# Patient Record
Sex: MALE | Race: WHITE | NOT HISPANIC OR LATINO | Employment: OTHER | URBAN - METROPOLITAN AREA
[De-identification: names, ages, dates, MRNs, and addresses within clinical notes are randomized per-mention and may not be internally consistent; named-entity substitution may affect disease eponyms.]

---

## 2022-12-14 ENCOUNTER — TELEPHONE (OUTPATIENT)
Dept: GASTROENTEROLOGY | Facility: CLINIC | Age: 67
End: 2022-12-14
Payer: MEDICARE

## 2022-12-14 NOTE — TELEPHONE ENCOUNTER
----- Message from Mukund Jain MA sent at 12/6/2022  8:57 AM CST -----  Contact: Patient wife    ----- Message -----  From: Sadaf Rodriguez  Sent: 12/6/2022   8:46 AM CST  To: [facility], #    Type:  Sooner Appointment Request      Name of Caller:Patient wife  When is the first available appointment?02/14/2022  Symptoms:HOSPITAL FOLLOW UP/diarrhea   Would the patient rather a call back or a response via [facility]? Call back  Best Call Back Number:[phone]   Additional Information: Please assist

## 2023-01-29 ENCOUNTER — OFFICE VISIT (OUTPATIENT)
Dept: URGENT CARE | Facility: CLINIC | Age: 68
End: 2023-01-29
Payer: MEDICARE

## 2023-01-29 VITALS
HEART RATE: 69 BPM | DIASTOLIC BLOOD PRESSURE: 53 MMHG | OXYGEN SATURATION: 98 % | RESPIRATION RATE: 14 BRPM | WEIGHT: 127 LBS | SYSTOLIC BLOOD PRESSURE: 113 MMHG | TEMPERATURE: 98 F | BODY MASS INDEX: 19.25 KG/M2 | HEIGHT: 68 IN

## 2023-01-29 DIAGNOSIS — R53.83 FATIGUE, UNSPECIFIED TYPE: Primary | ICD-10-CM

## 2023-01-29 DIAGNOSIS — R05.9 COUGH, UNSPECIFIED TYPE: ICD-10-CM

## 2023-01-29 LAB
CTP QC/QA: YES
SARS-COV-2 AG RESP QL IA.RAPID: NEGATIVE

## 2023-01-29 PROCEDURE — 3074F SYST BP LT 130 MM HG: CPT | Mod: CPTII,S$GLB,,

## 2023-01-29 PROCEDURE — 3078F DIAST BP <80 MM HG: CPT | Mod: CPTII,S$GLB,,

## 2023-01-29 PROCEDURE — 1126F AMNT PAIN NOTED NONE PRSNT: CPT | Mod: CPTII,S$GLB,,

## 2023-01-29 PROCEDURE — 1126F PR PAIN SEVERITY QUANTIFIED, NO PAIN PRESENT: ICD-10-PCS | Mod: CPTII,S$GLB,,

## 2023-01-29 PROCEDURE — 1160F PR REVIEW ALL MEDS BY PRESCRIBER/CLIN PHARMACIST DOCUMENTED: ICD-10-PCS | Mod: CPTII,S$GLB,,

## 2023-01-29 PROCEDURE — 87811 SARS CORONAVIRUS 2 ANTIGEN POCT, MANUAL READ: ICD-10-PCS | Mod: QW,S$GLB,,

## 2023-01-29 PROCEDURE — 3008F PR BODY MASS INDEX (BMI) DOCUMENTED: ICD-10-PCS | Mod: CPTII,S$GLB,,

## 2023-01-29 PROCEDURE — 99203 PR OFFICE/OUTPT VISIT, NEW, LEVL III, 30-44 MIN: ICD-10-PCS | Mod: S$GLB,,,

## 2023-01-29 PROCEDURE — 99203 OFFICE O/P NEW LOW 30 MIN: CPT | Mod: S$GLB,,,

## 2023-01-29 PROCEDURE — 87811 SARS-COV-2 COVID19 W/OPTIC: CPT | Mod: QW,S$GLB,,

## 2023-01-29 PROCEDURE — 3078F PR MOST RECENT DIASTOLIC BLOOD PRESSURE < 80 MM HG: ICD-10-PCS | Mod: CPTII,S$GLB,,

## 2023-01-29 PROCEDURE — 1159F MED LIST DOCD IN RCRD: CPT | Mod: CPTII,S$GLB,,

## 2023-01-29 PROCEDURE — 1159F PR MEDICATION LIST DOCUMENTED IN MEDICAL RECORD: ICD-10-PCS | Mod: CPTII,S$GLB,,

## 2023-01-29 PROCEDURE — 3008F BODY MASS INDEX DOCD: CPT | Mod: CPTII,S$GLB,,

## 2023-01-29 PROCEDURE — 1160F RVW MEDS BY RX/DR IN RCRD: CPT | Mod: CPTII,S$GLB,,

## 2023-01-29 PROCEDURE — 3074F PR MOST RECENT SYSTOLIC BLOOD PRESSURE < 130 MM HG: ICD-10-PCS | Mod: CPTII,S$GLB,,

## 2023-01-29 NOTE — PROGRESS NOTES
"Subjective:       Patient ID: Pacheco Rivas is a 67 y.o. male.    Vitals:  height is 5' 8" (1.727 m) and weight is 57.6 kg (127 lb). His tympanic temperature is 97.8 °F (36.6 °C). His blood pressure is 113/53 (abnormal) and his pulse is 69. His respiration is 14 and oxygen saturation is 98%.     Chief Complaint: Cough    Patient presents with cough and fatigue. Symptoms started yesterday.     Cough  This is a new problem. The current episode started yesterday. The problem has been unchanged. The problem occurs every few minutes. The cough is Non-productive. Pertinent negatives include no chest pain, chills, ear congestion, ear pain, fever, headaches, heartburn, hemoptysis, myalgias, nasal congestion, postnasal drip, rash, rhinorrhea, sore throat, shortness of breath, sweats, weight loss or wheezing. Nothing aggravates the symptoms. He has tried OTC cough suppressant for the symptoms. The treatment provided no relief. There is no history of asthma, bronchiectasis, bronchitis, COPD, emphysema, environmental allergies or pneumonia.     Constitution: Negative for chills and fever.   HENT:  Negative for ear pain, postnasal drip and sore throat.    Cardiovascular:  Negative for chest pain.   Respiratory:  Positive for cough. Negative for bloody sputum, shortness of breath and wheezing.    Gastrointestinal:  Negative for heartburn.   Musculoskeletal:  Negative for muscle ache.   Skin:  Negative for rash.   Allergic/Immunologic: Negative for environmental allergies.   Neurological:  Negative for headaches.     Objective:      Physical Exam   Constitutional: He is oriented to person, place, and time. He appears well-developed. He is cooperative.  Non-toxic appearance. He does not appear ill. No distress.      Comments:Appears underweight  NAD  Sitting comfortably in exam room  Walking unassisted     HENT:   Head: Normocephalic and atraumatic.   Ears:   Right Ear: Hearing, tympanic membrane, external ear and ear canal " normal.   Left Ear: Hearing, tympanic membrane, external ear and ear canal normal.   Nose: Nose normal. No mucosal edema, rhinorrhea or nasal deformity. No epistaxis. Right sinus exhibits no maxillary sinus tenderness and no frontal sinus tenderness. Left sinus exhibits no maxillary sinus tenderness and no frontal sinus tenderness.   Mouth/Throat: Uvula is midline, oropharynx is clear and moist and mucous membranes are normal. No trismus in the jaw. Normal dentition. No uvula swelling. No oropharyngeal exudate, posterior oropharyngeal edema or posterior oropharyngeal erythema.   Eyes: Conjunctivae and lids are normal. No scleral icterus.   Neck: Trachea normal and phonation normal. Neck supple. No edema present. No erythema present. No neck rigidity present.   Cardiovascular: Normal rate, regular rhythm, normal heart sounds and normal pulses.   Pulmonary/Chest: Effort normal and breath sounds normal. No respiratory distress. He has no decreased breath sounds. He has no rhonchi.   Abdominal: Normal appearance.   Musculoskeletal: Normal range of motion.         General: No deformity. Normal range of motion.   Neurological: He is alert and oriented to person, place, and time. He exhibits normal muscle tone. Coordination normal.   Skin: Skin is warm, dry, intact, not diaphoretic and not pale. Capillary refill takes less than 2 seconds.   Psychiatric: His speech is normal and behavior is normal. Judgment and thought content normal.   Nursing note and vitals reviewed.      Results for orders placed or performed in visit on 01/29/23   SARS Coronavirus 2 Antigen, POCT Manual Read   Result Value Ref Range    SARS Coronavirus 2 Antigen Negative Negative     Acceptable Yes        Assessment:       1. Fatigue, unspecified type    2. Cough, unspecified type          Plan:         Covid negative. Discussed d/c meclizine for a few doses to see if this helps his fatigue. Suspect there are multiple causes to his  fatigue though as patient has been hospitalized and see by his PCP multiple times over the last few months for C. Diff and hypotension. Discussed follow up with cardiology regarding hypotension. Discussed supportive care for cough and RTC/ED precautions which patient and his wife verbalized understanding to.     Fatigue, unspecified type    Cough, unspecified type  -     SARS Coronavirus 2 Antigen, POCT Manual Read

## 2023-03-04 ENCOUNTER — OFFICE VISIT (OUTPATIENT)
Dept: URGENT CARE | Facility: CLINIC | Age: 68
End: 2023-03-04
Payer: MEDICARE

## 2023-03-04 ENCOUNTER — HOSPITAL ENCOUNTER (OUTPATIENT)
Dept: RADIOLOGY | Facility: CLINIC | Age: 68
Discharge: HOME | End: 2023-03-04
Attending: NURSE PRACTITIONER
Payer: MEDICARE

## 2023-03-04 VITALS
BODY MASS INDEX: 19.25 KG/M2 | TEMPERATURE: 97 F | OXYGEN SATURATION: 100 % | SYSTOLIC BLOOD PRESSURE: 114 MMHG | HEART RATE: 83 BPM | RESPIRATION RATE: 14 BRPM | DIASTOLIC BLOOD PRESSURE: 56 MMHG | WEIGHT: 127 LBS | HEIGHT: 68 IN

## 2023-03-04 DIAGNOSIS — R68.89 FLU-LIKE SYMPTOMS: ICD-10-CM

## 2023-03-04 DIAGNOSIS — I15.2 HYPERTENSION ASSOCIATED WITH DIABETES: ICD-10-CM

## 2023-03-04 DIAGNOSIS — Z20.822 SUSPECTED COVID-19 VIRUS INFECTION: ICD-10-CM

## 2023-03-04 DIAGNOSIS — R53.83 OTHER FATIGUE: ICD-10-CM

## 2023-03-04 DIAGNOSIS — R05.9 COUGH, UNSPECIFIED TYPE: ICD-10-CM

## 2023-03-04 DIAGNOSIS — E11.59 HYPERTENSION ASSOCIATED WITH DIABETES: ICD-10-CM

## 2023-03-04 DIAGNOSIS — R53.83 OTHER FATIGUE: Primary | ICD-10-CM

## 2023-03-04 DIAGNOSIS — R11.2 NAUSEA AND VOMITING, UNSPECIFIED VOMITING TYPE: ICD-10-CM

## 2023-03-04 LAB
CTP QC/QA: YES
CTP QC/QA: YES
POC MOLECULAR INFLUENZA A AGN: NEGATIVE
POC MOLECULAR INFLUENZA B AGN: NEGATIVE
SARS-COV-2 AG RESP QL IA.RAPID: NEGATIVE

## 2023-03-04 PROCEDURE — 87502 POCT INFLUENZA A/B MOLECULAR: ICD-10-PCS | Mod: QW,S$GLB,, | Performed by: NURSE PRACTITIONER

## 2023-03-04 PROCEDURE — 3008F PR BODY MASS INDEX (BMI) DOCUMENTED: ICD-10-PCS | Mod: CPTII,S$GLB,, | Performed by: NURSE PRACTITIONER

## 2023-03-04 PROCEDURE — 99214 OFFICE O/P EST MOD 30 MIN: CPT | Mod: S$GLB,,, | Performed by: NURSE PRACTITIONER

## 2023-03-04 PROCEDURE — 1126F PR PAIN SEVERITY QUANTIFIED, NO PAIN PRESENT: ICD-10-PCS | Mod: CPTII,S$GLB,, | Performed by: NURSE PRACTITIONER

## 2023-03-04 PROCEDURE — 3078F DIAST BP <80 MM HG: CPT | Mod: CPTII,S$GLB,, | Performed by: NURSE PRACTITIONER

## 2023-03-04 PROCEDURE — 3074F PR MOST RECENT SYSTOLIC BLOOD PRESSURE < 130 MM HG: ICD-10-PCS | Mod: CPTII,S$GLB,, | Performed by: NURSE PRACTITIONER

## 2023-03-04 PROCEDURE — 1159F PR MEDICATION LIST DOCUMENTED IN MEDICAL RECORD: ICD-10-PCS | Mod: CPTII,S$GLB,, | Performed by: NURSE PRACTITIONER

## 2023-03-04 PROCEDURE — 1160F PR REVIEW ALL MEDS BY PRESCRIBER/CLIN PHARMACIST DOCUMENTED: ICD-10-PCS | Mod: CPTII,S$GLB,, | Performed by: NURSE PRACTITIONER

## 2023-03-04 PROCEDURE — 3074F SYST BP LT 130 MM HG: CPT | Mod: CPTII,S$GLB,, | Performed by: NURSE PRACTITIONER

## 2023-03-04 PROCEDURE — 71046 X-RAY EXAM CHEST 2 VIEWS: CPT | Mod: S$GLB,,, | Performed by: RADIOLOGY

## 2023-03-04 PROCEDURE — 99214 PR OFFICE/OUTPT VISIT, EST, LEVL IV, 30-39 MIN: ICD-10-PCS | Mod: S$GLB,,, | Performed by: NURSE PRACTITIONER

## 2023-03-04 PROCEDURE — 1126F AMNT PAIN NOTED NONE PRSNT: CPT | Mod: CPTII,S$GLB,, | Performed by: NURSE PRACTITIONER

## 2023-03-04 PROCEDURE — 87811 SARS CORONAVIRUS 2 ANTIGEN POCT, MANUAL READ: ICD-10-PCS | Mod: QW,S$GLB,, | Performed by: NURSE PRACTITIONER

## 2023-03-04 PROCEDURE — 1159F MED LIST DOCD IN RCRD: CPT | Mod: CPTII,S$GLB,, | Performed by: NURSE PRACTITIONER

## 2023-03-04 PROCEDURE — 3078F PR MOST RECENT DIASTOLIC BLOOD PRESSURE < 80 MM HG: ICD-10-PCS | Mod: CPTII,S$GLB,, | Performed by: NURSE PRACTITIONER

## 2023-03-04 PROCEDURE — 87811 SARS-COV-2 COVID19 W/OPTIC: CPT | Mod: QW,S$GLB,, | Performed by: NURSE PRACTITIONER

## 2023-03-04 PROCEDURE — 3008F BODY MASS INDEX DOCD: CPT | Mod: CPTII,S$GLB,, | Performed by: NURSE PRACTITIONER

## 2023-03-04 PROCEDURE — 1160F RVW MEDS BY RX/DR IN RCRD: CPT | Mod: CPTII,S$GLB,, | Performed by: NURSE PRACTITIONER

## 2023-03-04 PROCEDURE — 71046 XR CHEST PA AND LATERAL: ICD-10-PCS | Mod: S$GLB,,, | Performed by: RADIOLOGY

## 2023-03-04 PROCEDURE — 87502 INFLUENZA DNA AMP PROBE: CPT | Mod: QW,S$GLB,, | Performed by: NURSE PRACTITIONER

## 2023-03-04 RX ORDER — ONDANSETRON 4 MG/1
4 TABLET, ORALLY DISINTEGRATING ORAL EVERY 6 HOURS PRN
Qty: 6 TABLET | Refills: 0 | Status: SHIPPED | OUTPATIENT
Start: 2023-03-04

## 2023-03-04 RX ORDER — FLUTICASONE PROPIONATE 50 MCG
2 SPRAY, SUSPENSION (ML) NASAL DAILY
Qty: 16 G | Refills: 1 | Status: ON HOLD | OUTPATIENT
Start: 2023-03-04 | End: 2023-03-23 | Stop reason: HOSPADM

## 2023-03-04 NOTE — PATIENT INSTRUCTIONS
PLAN: Lab work POCT rapid Covid 19 screen/ negative, POCT influenza a and B/ negative,    CXR  per radiologist lungs are clear, no pleural fluid, no pneumothorax  Advise increase p.o. fluids--water/juice & rest  Meds:  Flonase and Zofran  / no refills  Simply saline nasal wash to irrigate sinuses and for congestion/runny nose.  Cool mist humidifier/vaporizer.  Practice good handwashing.  Advise use of green tea with honey  Tylenol or Ibuprofen for fever, headache and body aches.  Warm salt water gargles for throat comfort.  Chloraseptic spray or lozenges for throat comfort.  Advise follow up with PCP in 2-3 days for recheck  Advise go to ER if symptoms worsen or fail to improve with treatment.  Instructions, follow up, and supportive care as per AVS.  AVS provided and reviewed with patient including supportive care, follow up, and red flag symptoms.   Patient verbalizes understanding and agrees with treatment plan. Discharged from Urgent Care in stable condition.
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
## 2023-03-07 ENCOUNTER — TELEPHONE (OUTPATIENT)
Dept: URGENT CARE | Facility: CLINIC | Age: 68
End: 2023-03-07
Payer: MEDICARE

## 2023-03-16 ENCOUNTER — TELEPHONE (OUTPATIENT)
Dept: PREADMISSION TESTING | Facility: HOSPITAL | Age: 68
End: 2023-03-16
Payer: MEDICARE

## 2023-03-16 ENCOUNTER — TELEPHONE (OUTPATIENT)
Dept: GASTROENTEROLOGY | Facility: CLINIC | Age: 68
End: 2023-03-16
Payer: MEDICARE

## 2023-03-16 DIAGNOSIS — A49.8 CLOSTRIDIUM DIFFICILE INFECTION: Primary | ICD-10-CM

## 2023-03-16 NOTE — TELEPHONE ENCOUNTER
Spoke to Dr. Snyder regarding message below   Informed Dr. Snyder can be sent and we will schedule consult visit for FMT with Dr. Manzo once the patient records are received.   Dr. Snyder verbalize understand and thank me.     ----- Message from Sonia Trevino MA sent at 3/16/2023  8:30 AM CDT -----  Regarding: FW: Pt Advice  Contact: [phone]    ----- Message -----  From: Clarisa Hernandez  Sent: 3/16/2023   8:24 AM CDT  To: [facility] Clinical Staff  Subject: Pt Advice                                        Pt is currently being seen at [facility].  Pt has questions about who can help him get a fecal transplant.  Please call.

## 2023-03-17 ENCOUNTER — TELEPHONE (OUTPATIENT)
Dept: PREADMISSION TESTING | Facility: HOSPITAL | Age: 68
End: 2023-03-17
Payer: MEDICARE

## 2023-03-20 ENCOUNTER — TELEPHONE (OUTPATIENT)
Dept: GASTROENTEROLOGY | Facility: CLINIC | Age: 68
End: 2023-03-20
Payer: MEDICARE

## 2023-03-20 NOTE — TELEPHONE ENCOUNTER
Attempted to contact Juana regarding message below  Juana didn't answer and I left a detail message to return call to our office.      ----- Message from Clarisa Hernandez sent at 3/20/2023 12:15 PM CDT -----  Regarding: Pt Advice  Contact: [phone]  Juana with [facility] is calling to touch bases with you about pts fecal transplant surgery 03/23/23.   Pt wants Dr Manzo to be aware that he is taking vancomycin and states he hasn't given any instructions to stop it.  Pt has a PEG tube and takes all his medications that way.  Please call for any clarification.  Call Juana is you have any questions [phone].

## 2023-03-22 ENCOUNTER — TELEPHONE (OUTPATIENT)
Dept: GASTROENTEROLOGY | Facility: CLINIC | Age: 68
End: 2023-03-22
Payer: MEDICARE

## 2023-03-22 NOTE — TELEPHONE ENCOUNTER
----- Message from Nilay Manzo MD sent at 3/20/2023  6:39 PM CDT -----  Regarding: RE: Pt Advice  Contact: [phone]  I dont think thanh ever seen this patient and it appears to be scheduled in [place] with dr dailey  ----- Message -----  From: Pacheco Salomon MA  Sent: 3/20/2023   4:42 PM CDT  To: Nilay Manzo MD  Subject: FW: Pt Advice                                    Attempted to contact Juana regarding message below  Juana didn't answer and I left a detail message to return call to our office.    ----- Message -----  From: Clarisa Hernandez  Sent: 3/20/2023  12:19 PM CDT  To: [facility] Staff  Subject: Pt Advice                                        Juana with [facility] is calling to touch bases with you about pts fecal transplant surgery 03/23/23.   Pt wants Dr Manzo to be aware that he is taking vancomycin and states he hasn't given any instructions to stop it.  Pt has a PEG tube and takes all his medications that way.  Please call for any clarification.  Call Juana is you have any questions [phone].

## 2023-03-23 ENCOUNTER — HOSPITAL ENCOUNTER (OUTPATIENT)
Facility: HOSPITAL | Age: 68
Discharge: HOME | End: 2023-03-23
Attending: INTERNAL MEDICINE | Admitting: INTERNAL MEDICINE
Payer: MEDICARE

## 2023-03-23 ENCOUNTER — ANESTHESIA EVENT (OUTPATIENT)
Dept: ENDOSCOPY | Facility: HOSPITAL | Age: 68
End: 2023-03-23
Payer: MEDICARE

## 2023-03-23 ENCOUNTER — ANESTHESIA (OUTPATIENT)
Dept: ENDOSCOPY | Facility: HOSPITAL | Age: 68
End: 2023-03-23
Payer: MEDICARE

## 2023-03-23 DIAGNOSIS — A49.8 CLOSTRIDIUM DIFFICILE INFECTION: Primary | ICD-10-CM

## 2023-03-23 PROCEDURE — 25000003 PHARM REV CODE 250: Performed by: STUDENT IN AN ORGANIZED HEALTH CARE EDUCATION/TRAINING PROGRAM

## 2023-03-23 PROCEDURE — 63600175 PHARM REV CODE 636 W HCPCS: Performed by: NURSE ANESTHETIST, CERTIFIED REGISTERED

## 2023-03-23 PROCEDURE — 37000008 HC ANESTHESIA 1ST 15 MINUTES: Performed by: INTERNAL MEDICINE

## 2023-03-23 PROCEDURE — 45378 PR COLONOSCOPY,DIAGNOSTIC: ICD-10-PCS | Mod: ,,, | Performed by: INTERNAL MEDICINE

## 2023-03-23 PROCEDURE — 27201681 HC FECAL MICROBIOTA

## 2023-03-23 PROCEDURE — 45378 DIAGNOSTIC COLONOSCOPY: CPT | Performed by: INTERNAL MEDICINE

## 2023-03-23 PROCEDURE — 63600175 PHARM REV CODE 636 W HCPCS: Performed by: STUDENT IN AN ORGANIZED HEALTH CARE EDUCATION/TRAINING PROGRAM

## 2023-03-23 PROCEDURE — 37000009 HC ANESTHESIA EA ADD 15 MINS: Performed by: INTERNAL MEDICINE

## 2023-03-23 PROCEDURE — 45378 DIAGNOSTIC COLONOSCOPY: CPT | Mod: ,,, | Performed by: INTERNAL MEDICINE

## 2023-03-23 RX ORDER — LIDOCAINE HYDROCHLORIDE 10 MG/ML
INJECTION, SOLUTION EPIDURAL; INFILTRATION; INTRACAUDAL; PERINEURAL
Status: DISCONTINUED | OUTPATIENT
Start: 2023-03-23 | End: 2023-03-23

## 2023-03-23 RX ORDER — PROPOFOL 10 MG/ML
VIAL (ML) INTRAVENOUS
Status: DISCONTINUED | OUTPATIENT
Start: 2023-03-23 | End: 2023-03-23

## 2023-03-23 RX ORDER — ERGOCALCIFEROL (VITAMIN D2) 200 MCG/ML
8000 DROPS ORAL DAILY
COMMUNITY

## 2023-03-23 RX ORDER — PHENYLEPHRINE HYDROCHLORIDE 10 MG/ML
INJECTION INTRAVENOUS
Status: DISCONTINUED | OUTPATIENT
Start: 2023-03-23 | End: 2023-03-23

## 2023-03-23 RX ADMIN — PROPOFOL 50 MG: 10 INJECTION, EMULSION INTRAVENOUS at 03:03

## 2023-03-23 RX ADMIN — PHENYLEPHRINE HYDROCHLORIDE 100 MCG: 10 INJECTION INTRAVENOUS at 03:03

## 2023-03-23 RX ADMIN — LIDOCAINE HYDROCHLORIDE 50 MG: 10 INJECTION, SOLUTION EPIDURAL; INFILTRATION; INTRACAUDAL; PERINEURAL at 03:03

## 2023-03-23 RX ADMIN — SODIUM CHLORIDE, SODIUM LACTATE, POTASSIUM CHLORIDE, AND CALCIUM CHLORIDE: .6; .31; .03; .02 INJECTION, SOLUTION INTRAVENOUS at 02:03

## 2023-03-23 NOTE — TRANSFER OF CARE
"Anesthesia Transfer of Care Note    Patient: Pacheco Rivas    Procedure(s) Performed: Procedure(s) (LRB):  COLONOSCOPY w/ FMT (N/A)    Patient location: GI    Anesthesia Type: MAC    Transport from OR: Transported from OR on room air with adequate spontaneous ventilation    Post pain: adequate analgesia    Post assessment: no apparent anesthetic complications    Post vital signs: stable    Level of consciousness: awake and alert    Nausea/Vomiting: no nausea/vomiting    Complications: none    Transfer of care protocol was followed      Last vitals:   Visit Vitals  BP (!) 116/53 (BP Location: Left arm, Patient Position: Lying)   Pulse 73   Temp 36.8 °C (98.2 °F) (Temporal)   Resp 18   Ht 5' 8" (1.727 m)   Wt 52.6 kg (116 lb)   SpO2 97%   BMI 17.64 kg/m²     "

## 2023-03-23 NOTE — [employer] PATIENT INSTRUCTIONS
Discharge Summary/Instructions after an Endoscopic Procedure  Patient Name: Pacheco Rivas  Patient MRN: [id number]  Patient YOB: 1955 Thursday, March 23, 2023 America Meyer MD  Dear patient,  As a result of recent federal legislation (The Federal Cures Act), you may   receive lab or pathology results from your procedure in your [facility]   account before your physician is able to contact you. Your physician or   their representative will relay the results to you with their   recommendations at their soonest availability.  Thank you,  RESTRICTIONS:  During your procedure today, you received medications for sedation.  These   medications may affect your judgment, balance and coordination.  Therefore,   for 24 hours, you have the following restrictions:   - DO NOT drive a car, operate machinery, make legal/financial decisions,   sign important papers or drink alcohol.    ACTIVITY:  Today: no heavy lifting, straining or running due to procedural   sedation/anesthesia.  The following day: return to full activity including work.  DIET:  Eat and drink normally unless instructed otherwise.     TREATMENT FOR COMMON SIDE EFFECTS:  - Mild abdominal pain, nausea, belching, bloating or excessive gas:  rest,   eat lightly and use a heating pad.  - Sore Throat: treat with throat lozenges and/or gargle with warm salt   water.  - Because air was used during the procedure, expelling large amounts of air   from your rectum or belching is normal.  - If a bowel prep was taken, you may not have a bowel movement for 1-3 days.    This is normal.  SYMPTOMS TO WATCH FOR AND REPORT TO YOUR PHYSICIAN:  1. Abdominal pain or bloating, other than gas cramps.  2. Chest pain.  3. Back pain.  4. Signs of infection such as: chills or fever occurring within 24 hours   after the procedure.  5. Rectal bleeding, which would show as bright red, maroon, or black stools.   (A tablespoon of blood from the rectum is not serious, especially if    hemorrhoids are present.)  6. Vomiting.  7. Weakness or dizziness.  GO DIRECTLY TO THE NEAREST EMERGENCY ROOM IF YOU HAVE ANY OF THE FOLLOWING:      Difficulty breathing              Chills and/or fever over 101 F   Persistent vomiting and/or vomiting blood   Severe abdominal pain   Severe chest pain   Black, tarry stools   Bleeding- more than one tablespoon   Any other symptom or condition that you feel may need urgent attention  Your doctor recommends these additional instructions:  If any biopsies were taken, your doctors clinic will contact you in 1 to 2   weeks with any results.  - Discharge patient to home (with escort).   - Resume previous diet.   - Continue present medications.   - Stop vancomycin, stool bulking medicine and multivitamin  - Return to GI clinic your gastroenterologist Dr. Amaro on 3/18/23.  - The findings and recommendations were discussed with the patient's family.     - Repeat colonoscopy in 6 months for surveillance.  For questions, problems or results please call your physician America Meyer MD at Work:  [phone]  If you have any questions about the above instructions, call the GI   department at [phone] or call the endoscopy unit at [phone]   from 7am until 3 pm.  [facility], EMERGENCY ROOM PHONE NUMBER:   [phone]  IF A COMPLICATION OR EMERGENCY SITUATION ARISES AND YOU ARE UNABLE TO REACH   YOUR PHYSICIAN - GO DIRECTLY TO THE EMERGENCY ROOM.  I have read or have had read to me these discharge instructions for my   procedure and have received a written copy.  I understand these   instructions and will follow-up with my physician if I have any questions.     __________________________________       _____________________________________  Nurse Signature                                          Patient/Designated   Responsible Party Signature  MD America Pierce MD  3/23/2023 3:26:53 PM  This report has been verified and  signed electronically.  Dear patient,  As a result of recent federal legislation (The Federal Cures Act), you may   receive lab or pathology results from your procedure in your [facility]   account before your physician is able to contact you. Your physician or   their representative will relay the results to you with their   recommendations at their soonest availability.  Thank you,  PROVATION
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
## 2023-03-23 NOTE — ANESTHESIA PREPROCEDURE EVALUATION
03/23/2023  Pacheco Rivas is a 67 y.o., male.      Pre-op Assessment    I have reviewed the Patient Summary Reports.     I have reviewed the Nursing Notes. I have reviewed the NPO Status.   I have reviewed the Medications.     Review of Systems  Anesthesia Hx:  No problems with previous Anesthesia    Social:  Non-Smoker    Hematology/Oncology:  Hematology Normal   Oncology Normal     EENT/Dental:EENT/Dental Normal   Cardiovascular:   Hypertension, well controlled CAD asymptomatic CABG/stent  CHF hyperlipidemia    Pulmonary:  Pulmonary Normal    Renal/:   Chronic Renal Disease, CKD renal calculi    Hepatic/GI:   Bowel Prep.    Musculoskeletal:  Musculoskeletal Normal    Neurological:   Neuromuscular Disease,    Endocrine:   Diabetes, well controlled, type 2 Hypothyroidism    Dermatological:  Skin Normal    Psych:  Psychiatric Normal           Physical Exam  General: Well nourished    Airway:  Mallampati: II   Mouth Opening: Normal  TM Distance: Normal  Tongue: Normal  Neck ROM: Normal ROM    Dental:  Intact    Chest/Lungs:  Clear to auscultation    Heart:  Rate: Normal        Anesthesia Plan  Type of Anesthesia, risks & benefits discussed:    Anesthesia Type: MAC  Intra-op Monitoring Plan: Standard ASA Monitors  Induction:  IV  Informed Consent: Informed consent signed with the Patient and all parties understand the risks and agree with anesthesia plan.  All questions answered. Patient consented to blood products? Yes  ASA Score: 3    Ready For Surgery From Anesthesia Perspective.     .

## 2023-03-23 NOTE — ANESTHESIA POSTPROCEDURE EVALUATION
Anesthesia Post Evaluation    Patient: Pacheco Rivas    Procedure(s) Performed: Procedure(s) (LRB):  COLONOSCOPY w/ FMT (N/A)    Final Anesthesia Type: MAC      Patient location during evaluation: GI PACU  Patient participation: Yes- Able to Participate  Level of consciousness: awake and alert and oriented  Post-procedure vital signs: reviewed and stable  Pain management: adequate  Airway patency: patent  LA NENA mitigation strategies: Multimodal analgesia  PONV status at discharge: No PONV  Anesthetic complications: no      Cardiovascular status: blood pressure returned to baseline  Respiratory status: unassisted  Hydration status: euvolemic  Follow-up not needed.                                              No case tracking events are documented in the log.      Pain/Royce Score: No data recorded

## 2023-03-24 VITALS
HEART RATE: 62 BPM | OXYGEN SATURATION: 100 % | BODY MASS INDEX: 17.58 KG/M2 | DIASTOLIC BLOOD PRESSURE: 54 MMHG | RESPIRATION RATE: 18 BRPM | SYSTOLIC BLOOD PRESSURE: 103 MMHG | TEMPERATURE: 98 F | WEIGHT: 116 LBS | HEIGHT: 68 IN

## 2023-03-25 ENCOUNTER — OFFICE VISIT (OUTPATIENT)
Dept: URGENT CARE | Facility: CLINIC | Age: 68
End: 2023-03-25
Payer: MEDICARE

## 2023-03-25 ENCOUNTER — HOSPITAL ENCOUNTER (OUTPATIENT)
Dept: RADIOLOGY | Facility: CLINIC | Age: 68
Discharge: HOME | End: 2023-03-25
Payer: MEDICARE

## 2023-03-25 VITALS
TEMPERATURE: 98 F | HEIGHT: 68 IN | SYSTOLIC BLOOD PRESSURE: 104 MMHG | HEART RATE: 82 BPM | WEIGHT: 116 LBS | DIASTOLIC BLOOD PRESSURE: 53 MMHG | BODY MASS INDEX: 17.58 KG/M2 | RESPIRATION RATE: 16 BRPM | OXYGEN SATURATION: 98 %

## 2023-03-25 DIAGNOSIS — M25.531 RIGHT WRIST PAIN: ICD-10-CM
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
## 2023-03-25 DIAGNOSIS — R93.89 ABNORMAL CHEST X-RAY: Primary | ICD-10-CM

## 2023-03-25 DIAGNOSIS — M25.512 ACUTE PAIN OF LEFT SHOULDER: ICD-10-CM

## 2023-03-25 DIAGNOSIS — S20.212A CONTUSION OF RIB ON LEFT SIDE, INITIAL ENCOUNTER: ICD-10-CM

## 2023-03-25 PROCEDURE — 73030 XR SHOULDER COMPLETE 2 OR MORE VIEWS LEFT: ICD-10-PCS | Mod: LT,S$GLB,, | Performed by: RADIOLOGY

## 2023-03-25 PROCEDURE — 73110 X-RAY EXAM OF WRIST: CPT | Mod: RT,S$GLB,, | Performed by: RADIOLOGY

## 2023-03-25 PROCEDURE — 71101 XR RIBS MIN 3 VIEWS W/ PA CHEST LEFT: ICD-10-PCS | Mod: LT,S$GLB,, | Performed by: RADIOLOGY

## 2023-03-25 PROCEDURE — 71101 X-RAY EXAM UNILAT RIBS/CHEST: CPT | Mod: LT,S$GLB,, | Performed by: RADIOLOGY

## 2023-03-25 PROCEDURE — 99214 PR OFFICE/OUTPT VISIT, EST, LEVL IV, 30-39 MIN: ICD-10-PCS | Mod: S$GLB,,,

## 2023-03-25 PROCEDURE — 73030 X-RAY EXAM OF SHOULDER: CPT | Mod: LT,S$GLB,, | Performed by: RADIOLOGY

## 2023-03-25 PROCEDURE — 99214 OFFICE O/P EST MOD 30 MIN: CPT | Mod: S$GLB,,,

## 2023-03-25 PROCEDURE — 73110 XR WRIST COMPLETE 3 VIEWS RIGHT: ICD-10-PCS | Mod: RT,S$GLB,, | Performed by: RADIOLOGY

## 2023-03-25 NOTE — PROGRESS NOTES
"Subjective:       Patient ID: Pacheco Rivas is a 67 y.o. male.    Vitals:  height is 5' 8" (1.727 m) and weight is 52.6 kg (116 lb). His temperature is 97.8 °F (36.6 °C). His blood pressure is 104/53 (abnormal) and his pulse is 82. His respiration is 16 and oxygen saturation is 98%.     Chief Complaint: Fall    Pt presents with injury from chair that broke when he went to sit down in it. This happened on Thursday. Since then, pt complains of lower rib pain on his left side, left should pain and right wrist pain. States it hurts to take a deep breath in.   Patient saw his PCP yesterday who ordered rib, wrist, and shoulder xrays. Imaging was scheduled to be done on Monday in [place].     Fall  The accident occurred 2 days ago. He fell from a height of 1 to 2 ft. He landed on Hard floor. There was no blood loss. The point of impact was the head, right wrist and left shoulder. The pain is present in the left shoulder and right wrist. The pain is at a severity of 10/10. The pain is severe. The symptoms are aggravated by movement. Associated symptoms include abdominal pain and vomiting. Pertinent negatives include no bowel incontinence, fever, headaches, hearing loss, hematuria, loss of consciousness, nausea, numbness, tingling or visual change. He has tried nothing for the symptoms.     Constitution: Negative for fever.   Gastrointestinal:  Positive for abdominal pain and vomiting. Negative for nausea and bowel incontinence.   Genitourinary:  Negative for hematuria.   Neurological:  Negative for headaches, loss of consciousness and numbness.     Objective:      Physical Exam   Constitutional:  Non-toxic appearance. He does not appear ill. No distress.      Comments:Thin, frail  No increased WOB     HENT:   Head: Normocephalic and atraumatic.   Ears:   Right Ear: External ear normal.   Left Ear: External ear normal.   Eyes: Conjunctivae are normal.   Cardiovascular: Normal rate.   Pulmonary/Chest: Effort normal.       Left " lower ribs tender to palpation  No palpable crepitus  PEG tube in place             Comments: Lungs CTAB. Respirations E/U  Patient talking in complete sentences without difficulty or pause and     Left lower ribs tender to palpation  No palpable crepitus  PEG tube in place    Abdominal: Normal appearance.   Musculoskeletal:      Comments: Left anterior shoulder ttp. No overlying skin abnormalities. Limited ROM on exam due to pain    Right wrist tender to both radial and ulnar aspects. No obvious swelling or deformity. Patient reported with flexion/extension   Neurological: He is alert.   Skin: Skin is warm and dry.       X-Ray Chest PA And Lateral    Result Date: 3/4/2023  EXAMINATION: XR CHEST PA AND LATERAL CLINICAL HISTORY: Cough, unspecified TECHNIQUE: PA and lateral views of the chest were performed. FINDINGS: The lungs are clear.  There is no pneumothorax or pleural fluid.  The cardiac silhouette is not enlarged.  Patient is status post sternotomy.  There is degenerative change throughout the spine.     As above. Electronically signed by: Yash Nieves MD Date:    03/04/2023 Time:    13:19    XR WRIST COMPLETE 3 VIEWS RIGHT    Result Date: 3/25/2023  EXAMINATION: XR WRIST COMPLETE 3 VIEWS RIGHT CLINICAL HISTORY: Unspecified fall, initial encounter TECHNIQUE: XR WRIST COMPLETE 3 VIEWS RIGHT COMPARISON: None FINDINGS: Radiocarpal joint space narrowing is present, with deformity of the distal radius that may reflect remote fracture.  There is widening of the scapholunate interval the and proximal migration of the capitate.  On the lateral view normal anatomy is present across the midcarpal joint.  Irregular lucencies involving the proximal carpal row are noted.  Atherosclerotic vascular calcifications are present and there are arthritic changes of the base of the thumb.     No acute osseous injury is appreciated.  There is suspected remote distal radius fracture with scapholunate ligamentous disruption and [facility]  wrist morphology. Electronically signed by: Victor Manuel Maldonado Jr Date:    03/25/2023 Time:    15:52    X-Ray Shoulder 2 or More Views Left    Result Date: 3/25/2023  EXAMINATION: XR SHOULDER COMPLETE 2 OR MORE VIEWS LEFT CLINICAL HISTORY: Unspecified fall, initial encounter TECHNIQUE: Two or three views of the left shoulder were performed. COMPARISON: None FINDINGS: The osseous structures, soft tissues and joint spaces are normal.  Incidental note is made of postsurgical changes of spinal fusion of the cervicothoracic spine and CABG with median sternotomy.  The visualized left chest is normal.     Normal evaluation of the left shoulder. Electronically signed by: Mana Cerrato MD Date:    03/25/2023 Time:    16:10    XR RIB LEFT W/ PA CHEST    Result Date: 3/25/2023  EXAMINATION: XR RIBS MIN 3 VIEWS W/ PA CHEST LEFT CLINICAL HISTORY: Unspecified fall, initial encounter TECHNIQUE: Two views of the left ribs were performed. COMPARISON: Chest x-ray 03/04/2023 FINDINGS: There is no evidence of rib fracture, pneumothorax, or pleural thickening. There is an abnormal irregular opacity in the right upper lobe not definitely present on the prior imaging study.     Left ribs negative for fracture Asymmetric opacity in the right upper lobe, indeterminate etiology.  Further evaluation with chest CT is suggested. This report was flagged in Epic as abnormal. Electronically signed by: Victor Manuel Maldonado Jr Date:    03/25/2023 Time:    15:49       Assessment:       1. Abnormal chest x-ray    2. Fall, initial encounter    3. Right wrist pain    4. Acute pain of left shoulder    5. Contusion of rib on left side, initial encounter          Plan:         Xray findings reviewed. No acute findings of the right wrist or left shoulder. Discussed opacity seen in RUL on CXR. Discussed recommendation for chest CT. Discussed with patient and his wife I would recommend ED evaluation today for chest CT given patient's current state of health.  Patient and his wife are hesitant to go to ED today as he was just released from the hospital in the middle of March. Chart reviewed from hospital stay in mid March. There does not appear to be mention of upper lobe opacity when he had CT A/P. There was mention of lower lobe opacity. Patients wife informed of this and we discussed the concern that this is a new findings. Patients wife verbalized understanding and states she will discuss this with the patient. States they will likely wait and call PCP Monday morning.     Case discussed with Dr. Brown who is in agreement with my recommendation of patient going to ED today for chest CT.    Abnormal chest x-ray    Fall, initial encounter  -     XR RIB LEFT W/ PA CHEST; Future; Expected date: 03/25/2023  -     X-Ray Shoulder 2 or More Views Left; Future; Expected date: 03/25/2023  -     XR WRIST COMPLETE 3 VIEWS RIGHT; Future; Expected date: 03/25/2023    Right wrist pain    Acute pain of left shoulder    Contusion of rib on left side, initial encounter

## 2023-03-27 LAB — POCT GLUCOSE: 92 MG/DL (ref 70–110)

## 2023-03-28 ENCOUNTER — TELEPHONE (OUTPATIENT)
Dept: URGENT CARE | Facility: CLINIC | Age: 68
End: 2023-03-28
Payer: MEDICARE

## 2023-03-28 NOTE — TELEPHONE ENCOUNTER
Courtesy Call, Spoke with pt. He is currently in the hospital and seeking treatment for his symptoms

## 2023-04-04 ENCOUNTER — OFFICE VISIT (OUTPATIENT)
Dept: GASTROENTEROLOGY | Facility: CLINIC | Age: 68
End: 2023-04-04
Payer: MEDICARE

## 2023-04-04 ENCOUNTER — HOSPITAL ENCOUNTER (EMERGENCY)
Facility: HOSPITAL | Age: 68
Discharge: HOME | End: 2023-04-04
Attending: EMERGENCY MEDICINE
Payer: MEDICARE

## 2023-04-04 VITALS
SYSTOLIC BLOOD PRESSURE: 95 MMHG | WEIGHT: 115.31 LBS | BODY MASS INDEX: 17.47 KG/M2 | DIASTOLIC BLOOD PRESSURE: 57 MMHG | HEART RATE: 80 BPM | HEIGHT: 68 IN

## 2023-04-04 VITALS
HEIGHT: 68 IN | HEART RATE: 80 BPM | DIASTOLIC BLOOD PRESSURE: 69 MMHG | WEIGHT: 114.88 LBS | SYSTOLIC BLOOD PRESSURE: 118 MMHG | OXYGEN SATURATION: 98 % | BODY MASS INDEX: 17.41 KG/M2 | TEMPERATURE: 98 F | RESPIRATION RATE: 16 BRPM

## 2023-04-04 DIAGNOSIS — A49.8 CLOSTRIDIUM DIFFICILE INFECTION: Primary | ICD-10-CM

## 2023-04-04 DIAGNOSIS — E86.0 DEHYDRATION: Primary | ICD-10-CM

## 2023-04-04 DIAGNOSIS — J98.4 CAVITARY LESION OF LUNG: ICD-10-CM

## 2023-04-04 DIAGNOSIS — I95.9 HYPOTENSION, UNSPECIFIED HYPOTENSION TYPE: ICD-10-CM

## 2023-04-04 DIAGNOSIS — R53.83 FATIGUE: ICD-10-CM

## 2023-04-04 DIAGNOSIS — R06.02 SHORTNESS OF BREATH: ICD-10-CM

## 2023-04-04 DIAGNOSIS — R19.7 DIARRHEA, UNSPECIFIED TYPE: ICD-10-CM

## 2023-04-04 LAB
ALBUMIN SERPL BCP-MCNC: 3.4 G/DL (ref 3.5–5.2)
ALP SERPL-CCNC: 76 U/L (ref 55–135)
ALT SERPL W/O P-5'-P-CCNC: 31 U/L (ref 10–44)
ANION GAP SERPL CALC-SCNC: 10 MMOL/L (ref 8–16)
AST SERPL-CCNC: 28 U/L (ref 10–40)
BACTERIA #/AREA URNS HPF: ABNORMAL /HPF
BASOPHILS # BLD AUTO: 0.04 K/UL (ref 0–0.2)
BASOPHILS NFR BLD: 0.5 % (ref 0–1.9)
BILIRUB SERPL-MCNC: 0.3 MG/DL (ref 0.1–1)
BILIRUB UR QL STRIP: NEGATIVE
BNP SERPL-MCNC: 179 PG/ML (ref 0–99)
BUN SERPL-MCNC: 28 MG/DL (ref 8–23)
CALCIUM SERPL-MCNC: 9.1 MG/DL (ref 8.7–10.5)
CHLORIDE SERPL-SCNC: 100 MMOL/L (ref 95–110)
CLARITY UR: CLEAR
CO2 SERPL-SCNC: 24 MMOL/L (ref 23–29)
COLOR UR: YELLOW
CREAT SERPL-MCNC: 1.2 MG/DL (ref 0.5–1.4)
DIFFERENTIAL METHOD: ABNORMAL
EOSINOPHIL # BLD AUTO: 0.2 K/UL (ref 0–0.5)
EOSINOPHIL NFR BLD: 2.3 % (ref 0–8)
ERYTHROCYTE [DISTWIDTH] IN BLOOD BY AUTOMATED COUNT: 13.7 % (ref 11.5–14.5)
EST. GFR  (NO RACE VARIABLE): >60 ML/MIN/1.73 M^2
GLUCOSE SERPL-MCNC: 203 MG/DL (ref 70–110)
GLUCOSE UR QL STRIP: NEGATIVE
HCT VFR BLD AUTO: 32.8 % (ref 40–54)
HCV AB SERPL QL IA: NEGATIVE
HEP C VIRUS HOLD SPECIMEN: NORMAL
HGB BLD-MCNC: 11.4 G/DL (ref 14–18)
HGB UR QL STRIP: NEGATIVE
HIV 1+2 AB+HIV1 P24 AG SERPL QL IA: NEGATIVE
HYALINE CASTS #/AREA URNS LPF: 11 /LPF
IMM GRANULOCYTES # BLD AUTO: 0.08 K/UL (ref 0–0.04)
IMM GRANULOCYTES NFR BLD AUTO: 1.1 % (ref 0–0.5)
KETONES UR QL STRIP: NEGATIVE
LACTATE SERPL-SCNC: 0.7 MMOL/L (ref 0.5–2.2)
LEUKOCYTE ESTERASE UR QL STRIP: ABNORMAL
LIPASE SERPL-CCNC: 26 U/L (ref 4–60)
LYMPHOCYTES # BLD AUTO: 1.2 K/UL (ref 1–4.8)
LYMPHOCYTES NFR BLD: 16.5 % (ref 18–48)
MCH RBC QN AUTO: 31.2 PG (ref 27–31)
MCHC RBC AUTO-ENTMCNC: 34.8 G/DL (ref 32–36)
MCV RBC AUTO: 90 FL (ref 82–98)
MICROSCOPIC COMMENT: ABNORMAL
MONOCYTES # BLD AUTO: 0.5 K/UL (ref 0.3–1)
MONOCYTES NFR BLD: 7.2 % (ref 4–15)
NEUTROPHILS # BLD AUTO: 5.3 K/UL (ref 1.8–7.7)
NEUTROPHILS NFR BLD: 72.4 % (ref 38–73)
NITRITE UR QL STRIP: NEGATIVE
NRBC BLD-RTO: 0 /100 WBC
PH UR STRIP: 6 [PH] (ref 5–8)
PLATELET # BLD AUTO: 443 K/UL (ref 150–450)
PLATELET BLD QL SMEAR: NORMAL
PMV BLD AUTO: 9.2 FL (ref 9.2–12.9)
POCT GLUCOSE: 217 MG/DL (ref 70–110)
POTASSIUM SERPL-SCNC: 4.4 MMOL/L (ref 3.5–5.1)
PROT SERPL-MCNC: 8.2 G/DL (ref 6–8.4)
PROT UR QL STRIP: ABNORMAL
RBC # BLD AUTO: 3.65 M/UL (ref 4.6–6.2)
RBC #/AREA URNS HPF: 12 /HPF (ref 0–4)
SODIUM SERPL-SCNC: 134 MMOL/L (ref 136–145)
SP GR UR STRIP: 1.02 (ref 1–1.03)
SQUAMOUS #/AREA URNS HPF: 2 /HPF
TROPONIN I SERPL DL<=0.01 NG/ML-MCNC: <0.006 NG/ML (ref 0–0.03)
UNIDENT CRYS URNS QL MICRO: ABNORMAL
URN SPEC COLLECT METH UR: ABNORMAL
UROBILINOGEN UR STRIP-ACNC: NEGATIVE EU/DL
WBC # BLD AUTO: 7.35 K/UL (ref 3.9–12.7)
WBC #/AREA URNS HPF: 5 /HPF (ref 0–5)
WBC CLUMPS URNS QL MICRO: ABNORMAL

## 2023-04-04 PROCEDURE — 93005 ELECTROCARDIOGRAM TRACING: CPT

## 2023-04-04 PROCEDURE — 3008F BODY MASS INDEX DOCD: CPT | Mod: CPTII,S$GLB,, | Performed by: PHYSICIAN ASSISTANT

## 2023-04-04 PROCEDURE — 3288F FALL RISK ASSESSMENT DOCD: CPT | Mod: CPTII,S$GLB,, | Performed by: PHYSICIAN ASSISTANT

## 2023-04-04 PROCEDURE — 93010 EKG 12-LEAD: ICD-10-PCS | Mod: ,,, | Performed by: INTERNAL MEDICINE

## 2023-04-04 PROCEDURE — 81000 URINALYSIS NONAUTO W/SCOPE: CPT | Performed by: NURSE PRACTITIONER

## 2023-04-04 PROCEDURE — 83880 ASSAY OF NATRIURETIC PEPTIDE: CPT | Performed by: NURSE PRACTITIONER

## 2023-04-04 PROCEDURE — 96361 HYDRATE IV INFUSION ADD-ON: CPT

## 2023-04-04 PROCEDURE — 99214 OFFICE O/P EST MOD 30 MIN: CPT | Mod: S$GLB,,, | Performed by: PHYSICIAN ASSISTANT

## 2023-04-04 PROCEDURE — 1125F PR PAIN SEVERITY QUANTIFIED, PAIN PRESENT: ICD-10-PCS | Mod: CPTII,S$GLB,, | Performed by: PHYSICIAN ASSISTANT

## 2023-04-04 PROCEDURE — 3288F PR FALLS RISK ASSESSMENT DOCUMENTED: ICD-10-PCS | Mod: CPTII,S$GLB,, | Performed by: PHYSICIAN ASSISTANT

## 2023-04-04 PROCEDURE — 1159F MED LIST DOCD IN RCRD: CPT | Mod: CPTII,S$GLB,, | Performed by: PHYSICIAN ASSISTANT

## 2023-04-04 PROCEDURE — 99999 PR PBB SHADOW E&M-EST. PATIENT-LVL IV: CPT | Mod: PBBFAC,,, | Performed by: PHYSICIAN ASSISTANT

## 2023-04-04 PROCEDURE — 84484 ASSAY OF TROPONIN QUANT: CPT | Performed by: NURSE PRACTITIONER

## 2023-04-04 PROCEDURE — 1159F PR MEDICATION LIST DOCUMENTED IN MEDICAL RECORD: ICD-10-PCS | Mod: CPTII,S$GLB,, | Performed by: PHYSICIAN ASSISTANT

## 2023-04-04 PROCEDURE — 3074F SYST BP LT 130 MM HG: CPT | Mod: CPTII,S$GLB,, | Performed by: PHYSICIAN ASSISTANT

## 2023-04-04 PROCEDURE — 96360 HYDRATION IV INFUSION INIT: CPT

## 2023-04-04 PROCEDURE — 1100F PTFALLS ASSESS-DOCD GE2>/YR: CPT | Mod: CPTII,S$GLB,, | Performed by: PHYSICIAN ASSISTANT

## 2023-04-04 PROCEDURE — 3074F PR MOST RECENT SYSTOLIC BLOOD PRESSURE < 130 MM HG: ICD-10-PCS | Mod: CPTII,S$GLB,, | Performed by: PHYSICIAN ASSISTANT

## 2023-04-04 PROCEDURE — 85025 COMPLETE CBC W/AUTO DIFF WBC: CPT | Performed by: NURSE PRACTITIONER

## 2023-04-04 PROCEDURE — 3078F PR MOST RECENT DIASTOLIC BLOOD PRESSURE < 80 MM HG: ICD-10-PCS | Mod: CPTII,S$GLB,, | Performed by: PHYSICIAN ASSISTANT

## 2023-04-04 PROCEDURE — 83605 ASSAY OF LACTIC ACID: CPT | Performed by: NURSE PRACTITIONER

## 2023-04-04 PROCEDURE — 82962 GLUCOSE BLOOD TEST: CPT

## 2023-04-04 PROCEDURE — 80053 COMPREHEN METABOLIC PANEL: CPT | Performed by: NURSE PRACTITIONER

## 2023-04-04 PROCEDURE — 3008F PR BODY MASS INDEX (BMI) DOCUMENTED: ICD-10-PCS | Mod: CPTII,S$GLB,, | Performed by: PHYSICIAN ASSISTANT

## 2023-04-04 PROCEDURE — 99999 PR PBB SHADOW E&M-EST. PATIENT-LVL IV: ICD-10-PCS | Mod: PBBFAC,,, | Performed by: PHYSICIAN ASSISTANT

## 2023-04-04 PROCEDURE — 87389 HIV-1 AG W/HIV-1&-2 AB AG IA: CPT | Performed by: EMERGENCY MEDICINE

## 2023-04-04 PROCEDURE — 99214 PR OFFICE/OUTPT VISIT, EST, LEVL IV, 30-39 MIN: ICD-10-PCS | Mod: S$GLB,,, | Performed by: PHYSICIAN ASSISTANT

## 2023-04-04 PROCEDURE — 93010 ELECTROCARDIOGRAM REPORT: CPT | Mod: ,,, | Performed by: INTERNAL MEDICINE

## 2023-04-04 PROCEDURE — 99284 EMERGENCY DEPT VISIT MOD MDM: CPT | Mod: 25

## 2023-04-04 PROCEDURE — 83690 ASSAY OF LIPASE: CPT | Performed by: NURSE PRACTITIONER

## 2023-04-04 PROCEDURE — 1125F AMNT PAIN NOTED PAIN PRSNT: CPT | Mod: CPTII,S$GLB,, | Performed by: PHYSICIAN ASSISTANT

## 2023-04-04 PROCEDURE — 3078F DIAST BP <80 MM HG: CPT | Mod: CPTII,S$GLB,, | Performed by: PHYSICIAN ASSISTANT

## 2023-04-04 PROCEDURE — 25000003 PHARM REV CODE 250: Performed by: NURSE PRACTITIONER

## 2023-04-04 PROCEDURE — 86803 HEPATITIS C AB TEST: CPT | Performed by: EMERGENCY MEDICINE

## 2023-04-04 PROCEDURE — 1100F PR PT FALLS ASSESS DOC 2+ FALLS/FALL W/INJURY/YR: ICD-10-PCS | Mod: CPTII,S$GLB,, | Performed by: PHYSICIAN ASSISTANT

## 2023-04-04 RX ORDER — GABAPENTIN 250 MG/5ML
5 SOLUTION ORAL 2 TIMES DAILY
COMMUNITY
Start: 2023-03-21

## 2023-04-04 RX ORDER — ROPINIROLE 1 MG/1
1 TABLET, FILM COATED ORAL 3 TIMES DAILY
COMMUNITY
Start: 2023-03-06

## 2023-04-04 RX ORDER — CHOLESTYRAMINE 4 G/9G
4 POWDER, FOR SUSPENSION ORAL 2 TIMES DAILY
COMMUNITY
Start: 2023-03-20

## 2023-04-04 RX ORDER — METRONIDAZOLE 500 MG/1
500 TABLET ORAL 3 TIMES DAILY
Status: ON HOLD | COMMUNITY
Start: 2023-03-30 | End: 2023-04-10 | Stop reason: SDUPTHER

## 2023-04-04 RX ORDER — VANCOMYCIN HCL 50 MG/ML
125 SOLUTION, RECONSTITUTED, ORAL ORAL 4 TIMES DAILY
Status: ON HOLD | COMMUNITY
Start: 2023-03-30 | End: 2023-04-10 | Stop reason: SDUPTHER

## 2023-04-04 RX ORDER — SUCRALFATE 1 G/10ML
10 SUSPENSION ORAL 4 TIMES DAILY
COMMUNITY
Start: 2023-03-21

## 2023-04-04 RX ORDER — ATORVASTATIN CALCIUM 40 MG/1
40 TABLET, FILM COATED ORAL NIGHTLY
COMMUNITY
Start: 2023-03-06

## 2023-04-04 RX ORDER — NAPROXEN SODIUM 220 MG/1
81 TABLET, FILM COATED ORAL DAILY
Status: ON HOLD | COMMUNITY
End: 2023-04-19 | Stop reason: HOSPADM

## 2023-04-04 RX ORDER — METFORMIN HYDROCHLORIDE 500 MG/1
500 TABLET, EXTENDED RELEASE ORAL NIGHTLY
COMMUNITY
Start: 2023-02-11

## 2023-04-04 RX ORDER — FOLIC ACID 1 MG/1
1 TABLET ORAL DAILY
COMMUNITY
End: 2023-04-04 | Stop reason: ALTCHOICE

## 2023-04-04 RX ORDER — LEVOTHYROXINE SODIUM 25 UG/1
25 TABLET ORAL EVERY MORNING
COMMUNITY
Start: 2023-03-10

## 2023-04-04 RX ORDER — CEFDINIR 300 MG/1
300 CAPSULE ORAL 2 TIMES DAILY
Status: ON HOLD | COMMUNITY
Start: 2023-03-30 | End: 2023-04-10 | Stop reason: SDUPTHER

## 2023-04-04 RX ORDER — TAMSULOSIN HYDROCHLORIDE 0.4 MG/1
0.4 CAPSULE ORAL DAILY
COMMUNITY

## 2023-04-04 RX ORDER — MIDODRINE HYDROCHLORIDE 2.5 MG/1
1 TABLET ORAL 2 TIMES DAILY
COMMUNITY
Start: 2023-02-03 | End: 2023-04-17 | Stop reason: DRUGHIGH

## 2023-04-04 RX ORDER — MECLIZINE HYDROCHLORIDE 25 MG/1
1 TABLET ORAL 3 TIMES DAILY PRN
COMMUNITY
Start: 2023-01-27

## 2023-04-04 RX ADMIN — SODIUM CHLORIDE 1000 ML: 9 INJECTION, SOLUTION INTRAVENOUS at 03:04

## 2023-04-04 NOTE — FIRST PROVIDER EVALUATION
Emergency Department TeleTriage Encounter Note      CHIEF COMPLAINT    Chief Complaint   Patient presents with    Dehydration     Sent by GI doc for dehydration, weakness, and hypotension; currently has c-diff       VITAL SIGNS   Initial Vitals [04/04/23 1452]   BP Pulse Resp Temp SpO2   134/60 87 20 97.5 °F (36.4 °C) 98 %      MAP       --            ALLERGIES    Review of patient's allergies indicates:   Allergen Reactions    Hydrocodone-acetaminophen Hives    Oxycodone-acetaminophen Hives       PROVIDER TRIAGE NOTE  This is a teletriage evaluation of a 67 y.o. male presenting to the ED complaining of fatigue. Pt currently on abx cavitary lung lesion was found and pt started on omnicef, flagyl, and vanc.  Pt reports return of diarrhea a few days later. Pmhx of c.diff.  Reports continued abd pain.  Hypotensive in clinic today and referred to ED.     Initial orders will be placed and care will be transferred to an alternate provider when patient is roomed for a full evaluation. Any additional orders and the final disposition will be determined by that provider.         ORDERS  Labs Reviewed   HIV 1 / 2 ANTIBODY   HEPATITIS C ANTIBODY   HEP C VIRUS HOLD SPECIMEN   CBC W/ AUTO DIFFERENTIAL   COMPREHENSIVE METABOLIC PANEL   LIPASE       ED Orders (720h ago, onward)      Start Ordered     Status Ordering Provider    04/04/23 1600 04/04/23 1514  Vital Signs  Every 2 hours         Ordered THOMPSON MITCHELL N.    04/04/23 1515 04/04/23 1514  Vital signs  Every 2 hours         Ordered THOMPSON MITCHELL N.    04/04/23 1515 04/04/23 1514  sodium chloride 0.9% bolus 1,000 mL 1,000 mL  ED 1 Time         Ordered THOMPSON MITCHELL N.    04/04/23 1514 04/04/23 1514  Diet NPO  Diet effective now         Ordered THOMPSON MITCHELL N.    04/04/23 1514 04/04/23 1514  Insert peripheral IV  Once         Ordered THOMPSON MITCHELL N.    04/04/23 1514 04/04/23 1514  CBC W/ AUTO DIFFERENTIAL  STAT         Ordered  ROBINAMARIELENA THOMPSON N.    04/04/23 1514 04/04/23 1514  Comp. Metabolic Panel  STAT         Ordered SARAHCYNTHIA THOMPSON N.    04/04/23 1514 04/04/23 1514  Lipase  STAT         Ordered HERIBERTOTHERESA THOMPSON N.    04/04/23 1514 04/04/23 1514  Urinalysis, Reflex to Urine Culture Urine, Clean Catch  STAT         Ordered LONGLASHELL THOMPSON N.    04/04/23 1514 04/04/23 1514  Lactic acid, plasma  STAT         Ordered VASQUEZRUI THOMPSON N.    04/04/23 1514 04/04/23 1514  EKG 12-lead  Once         Ordered SARAHCYNTHIA THOMPSON N.    04/04/23 1514 04/04/23 1514  Troponin I  STAT         Ordered VASQUEZRUI THOMPSON N.    04/04/23 1514 04/04/23 1514  Brain natriuretic peptide  STAT         Ordered SARAHCYNTHIA THOMPSON N.    04/04/23 1514 04/04/23 1514  POCT glucose  Once         Ordered HERIBERTOTHERESA THOMPSON N.    04/04/23 1514 04/04/23 1514  Cardiac Monitoring - Adult  Continuous        Comments: Notify Physician If:    Ordered LONGLASHELL THOMPSON N.    04/04/23 1514 04/04/23 1514  Insert Saline lock IV  Once         Ordered LONGLASHELL THOMPSON N.    04/04/23 1453 04/04/23 1452  HIV 1/2 Ag/Ab (4th Gen)  STAT         Ordered TREMAINE NASCIMENTO P.    04/04/23 1453 04/04/23 1452  Hepatitis C Antibody  STAT        See Hyperspace for full Linked Orders Report.    Ordered TREMAINE NASCIMENTO P.    04/04/23 1453 04/04/23 1452  HCV Virus Hold Specimen  STAT        See Hyperspace for full Linked Orders Report.    Ordered TREMAINE NASCIMENTO              Virtual Visit Note: The provider triage portion of this emergency department evaluation and documentation was performed via [employer], a HIPAA-compliant telemedicine application, in concert with a tele-presenter in the room. A face to face patient evaluation with one of my colleagues will occur once the patient is placed in an emergency department room.      DISCLAIMER: This note was prepared with [employer] voice recognition transcription software. Garbled syntax,  mangled pronouns, and other bizarre constructions may be attributed to that software system.
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
## 2023-04-05 NOTE — PHARMACY MED REC
"Admission Medication History     The home medication history was taken by Blayne Salomon.    You may go to "Admission" then "Reconcile Home Medications" tabs to review and/or act upon these items.     The home medication list has been updated by the Pharmacy department.   Please read ALL comments highlighted in yellow.   Please address this information as you see fit.    Feel free to contact us if you have any questions or require assistance.      Medications listed below were obtained from: Analytic software- [employer] and Medical records  (Not in a hospital admission)      Blayne Salomon  [id number]    Current Outpatient Medications on File Prior to Encounter   Medication Sig Dispense Refill Last Dose    fluticasone-umeclidin-vilanter (TRELEGY ELLIPTA) 100-62.5-25 mcg DsDv Take 1 puff by mouth once daily.       ranolazine (RANEXA) 500 MG Tb12 Take 1 tablet (500 mg total) by mouth 2 (two) times daily. 60 tablet 11     aspirin 81 MG Chew Take 81 mg by mouth once daily.       atorvastatin (LIPITOR) 40 MG tablet Take 40 mg by mouth every evening.       carvedilol (COREG) 12.5 MG tablet Take 12.5 mg by mouth 2 (two) times daily.        cefdinir (OMNICEF) 300 MG capsule Take 300 mg by mouth 2 (two) times daily.       cholestyramine (QUESTRAN) 4 gram packet Take 4 g by mouth 2 (two) times daily.       clopidogrel (PLAVIX) 75 mg tablet Take 75 mg by mouth once daily.       ergocalciferol (DRISDOL) 200 mcg/mL (8,000 unit/mL) Drop Take 8,000 Units by mouth once daily.       gabapentin (NEURONTIN) 250 mg/5 mL solution Take 5 mLs by mouth 2 (two) times a day.       insulin glargine-lixisenatide (SOLIQUA 100/33) 100 unit-33 mcg/mL InPn pen Inject 50 Units into the skin every morning.       levothyroxine (SYNTHROID) 25 MCG tablet Take 25 mcg by mouth every morning.       meclizine (ANTIVERT) 25 mg tablet Take 1 tablet by mouth 3 (three) times daily as needed.       metFORMIN (GLUCOPHAGE-XR) 500 MG ER 24hr tablet Take 500 " mg by mouth every evening.       metroNIDAZOLE (FLAGYL) 500 MG tablet Take 500 mg by mouth 3 (three) times daily.       midodrine (PROAMATINE) 2.5 MG Tab Take 1 tablet by mouth 2 (two) times a day.       ondansetron (ZOFRAN-ODT) 4 MG TbDL Take 1 tablet (4 mg total) by mouth every 6 (six) hours as needed (prn). 6 tablet 0     rOPINIRole (REQUIP) 1 MG tablet Take 1 tablet by mouth 3 (three) times daily.       sucralfate (CARAFATE) 100 mg/mL suspension 10 mLs by Per G Tube route 4 (four) times daily.       tamsulosin (FLOMAX) 0.4 mg Cap Take 0.4 mg by mouth once daily.       vancomycin 50 mg/mL SolR 125 mg by FEEDING TUBE route 4 (four) times daily.                              .
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
## 2023-04-08 ENCOUNTER — HOSPITAL ENCOUNTER (OUTPATIENT)
Facility: HOSPITAL | Age: 68
Discharge: HOME | End: 2023-04-10
Attending: FAMILY MEDICINE | Admitting: INTERNAL MEDICINE
Payer: MEDICARE

## 2023-04-08 DIAGNOSIS — A49.8 CLOSTRIDIUM DIFFICILE INFECTION: ICD-10-CM

## 2023-04-08 DIAGNOSIS — K59.1 FUNCTIONAL DIARRHEA: ICD-10-CM

## 2023-04-08 DIAGNOSIS — R53.1 WEAKNESS: ICD-10-CM

## 2023-04-08 DIAGNOSIS — J18.9 PNEUMONIA OF RIGHT UPPER LOBE DUE TO INFECTIOUS ORGANISM: ICD-10-CM

## 2023-04-08 DIAGNOSIS — I95.9 HYPOTENSION, UNSPECIFIED HYPOTENSION TYPE: ICD-10-CM

## 2023-04-08 DIAGNOSIS — Z93.1 PEG (PERCUTANEOUS ENDOSCOPIC GASTROSTOMY) STATUS: ICD-10-CM

## 2023-04-08 DIAGNOSIS — J98.4 CAVITATING MASS OF LUNG: ICD-10-CM

## 2023-04-08 DIAGNOSIS — E11.65 TYPE 2 DIABETES MELLITUS WITH HYPERGLYCEMIA, WITHOUT LONG-TERM CURRENT USE OF INSULIN: ICD-10-CM

## 2023-04-08 DIAGNOSIS — E11.42 DIABETIC PERIPHERAL NEUROPATHY ASSOCIATED WITH TYPE 2 DIABETES MELLITUS: ICD-10-CM

## 2023-04-08 DIAGNOSIS — E11.21 DIABETIC NEPHROPATHY ASSOCIATED WITH TYPE 2 DIABETES MELLITUS: ICD-10-CM

## 2023-04-08 DIAGNOSIS — E11.59 HYPERTENSION ASSOCIATED WITH DIABETES: ICD-10-CM

## 2023-04-08 DIAGNOSIS — R53.1 GENERALIZED WEAKNESS: ICD-10-CM

## 2023-04-08 DIAGNOSIS — I25.84 CORONARY ATHEROSCLEROSIS DUE TO CALCIFIED CORONARY LESION: ICD-10-CM

## 2023-04-08 DIAGNOSIS — I25.10 CORONARY ATHEROSCLEROSIS DUE TO CALCIFIED CORONARY LESION: ICD-10-CM

## 2023-04-08 DIAGNOSIS — I15.2 HYPERTENSION ASSOCIATED WITH DIABETES: ICD-10-CM

## 2023-04-08 DIAGNOSIS — R19.7 DIARRHEA, UNSPECIFIED TYPE: Primary | ICD-10-CM

## 2023-04-08 LAB
ALBUMIN SERPL BCP-MCNC: 3.2 G/DL (ref 3.5–5.2)
ALP SERPL-CCNC: 71 U/L (ref 55–135)
ALT SERPL W/O P-5'-P-CCNC: 19 U/L (ref 10–44)
ANION GAP SERPL CALC-SCNC: 12 MMOL/L (ref 8–16)
AST SERPL-CCNC: 16 U/L (ref 10–40)
BILIRUB SERPL-MCNC: 0.3 MG/DL (ref 0.1–1)
BNP SERPL-MCNC: 161 PG/ML (ref 0–99)
BUN SERPL-MCNC: 22 MG/DL (ref 8–23)
CALCIUM SERPL-MCNC: 9.4 MG/DL (ref 8.7–10.5)
CHLORIDE SERPL-SCNC: 102 MMOL/L (ref 95–110)
CO2 SERPL-SCNC: 20 MMOL/L (ref 23–29)
CREAT SERPL-MCNC: 1.1 MG/DL (ref 0.5–1.4)
EST. GFR  (NO RACE VARIABLE): >60 ML/MIN/1.73 M^2
GLUCOSE SERPL-MCNC: 237 MG/DL (ref 70–110)
POTASSIUM SERPL-SCNC: 4.1 MMOL/L (ref 3.5–5.1)
PROT SERPL-MCNC: 7.8 G/DL (ref 6–8.4)
SODIUM SERPL-SCNC: 134 MMOL/L (ref 136–145)
TROPONIN I SERPL DL<=0.01 NG/ML-MCNC: <0.006 NG/ML (ref 0–0.03)

## 2023-04-08 PROCEDURE — 81000 URINALYSIS NONAUTO W/SCOPE: CPT | Performed by: FAMILY MEDICINE

## 2023-04-08 PROCEDURE — 84484 ASSAY OF TROPONIN QUANT: CPT | Performed by: FAMILY MEDICINE

## 2023-04-08 PROCEDURE — 80053 COMPREHEN METABOLIC PANEL: CPT | Performed by: FAMILY MEDICINE

## 2023-04-08 PROCEDURE — 83880 ASSAY OF NATRIURETIC PEPTIDE: CPT | Performed by: FAMILY MEDICINE

## 2023-04-08 PROCEDURE — 96375 TX/PRO/DX INJ NEW DRUG ADDON: CPT

## 2023-04-08 PROCEDURE — 93010 EKG 12-LEAD: ICD-10-PCS | Mod: ,,, | Performed by: INTERNAL MEDICINE

## 2023-04-08 PROCEDURE — 25000003 PHARM REV CODE 250: Performed by: FAMILY MEDICINE

## 2023-04-08 PROCEDURE — 85025 COMPLETE CBC W/AUTO DIFF WBC: CPT | Performed by: FAMILY MEDICINE

## 2023-04-08 PROCEDURE — 87449 NOS EACH ORGANISM AG IA: CPT | Performed by: FAMILY MEDICINE

## 2023-04-08 PROCEDURE — 99285 EMERGENCY DEPT VISIT HI MDM: CPT | Mod: 25

## 2023-04-08 PROCEDURE — 87040 BLOOD CULTURE FOR BACTERIA: CPT | Performed by: FAMILY MEDICINE

## 2023-04-08 PROCEDURE — 63600175 PHARM REV CODE 636 W HCPCS: Performed by: FAMILY MEDICINE

## 2023-04-08 PROCEDURE — 96361 HYDRATE IV INFUSION ADD-ON: CPT

## 2023-04-08 PROCEDURE — 93005 ELECTROCARDIOGRAM TRACING: CPT

## 2023-04-08 PROCEDURE — 82962 GLUCOSE BLOOD TEST: CPT

## 2023-04-08 PROCEDURE — 93010 ELECTROCARDIOGRAM REPORT: CPT | Mod: ,,, | Performed by: INTERNAL MEDICINE

## 2023-04-08 PROCEDURE — 83605 ASSAY OF LACTIC ACID: CPT | Performed by: FAMILY MEDICINE

## 2023-04-08 RX ORDER — MORPHINE SULFATE 4 MG/ML
4 INJECTION, SOLUTION INTRAMUSCULAR; INTRAVENOUS
Status: COMPLETED | OUTPATIENT
Start: 2023-04-08 | End: 2023-04-08

## 2023-04-08 RX ORDER — DIPHENHYDRAMINE HYDROCHLORIDE 50 MG/ML
25 INJECTION INTRAMUSCULAR; INTRAVENOUS
Status: COMPLETED | OUTPATIENT
Start: 2023-04-08 | End: 2023-04-08

## 2023-04-08 RX ADMIN — SODIUM CHLORIDE 1000 ML: 9 INJECTION, SOLUTION INTRAVENOUS at 11:04

## 2023-04-08 RX ADMIN — DIPHENHYDRAMINE HYDROCHLORIDE 25 MG: 50 INJECTION, SOLUTION INTRAMUSCULAR; INTRAVENOUS at 11:04

## 2023-04-08 RX ADMIN — MORPHINE SULFATE 4 MG: 4 INJECTION INTRAVENOUS at 11:04

## 2023-04-08 NOTE — Clinical Note
Diagnosis: Pneumonia of right upper lobe due to infectious organism [[id number]]   Future Attending Provider: BAHMAN MATOS [[id number]]   Admitting Provider:: BAHMAN MATOS [[id number]]

## 2023-04-09 PROBLEM — R63.6 UNDERWEIGHT: Status: ACTIVE | Noted: 2023-04-09

## 2023-04-09 PROBLEM — J98.4 CAVITATING MASS OF LUNG: Status: ACTIVE | Noted: 2023-04-09

## 2023-04-09 PROBLEM — R19.7 DIARRHEA: Status: ACTIVE | Noted: 2023-04-09

## 2023-04-09 PROBLEM — R53.1 GENERALIZED WEAKNESS: Status: ACTIVE | Noted: 2023-04-09

## 2023-04-09 PROBLEM — E11.65 TYPE 2 DIABETES MELLITUS WITH HYPERGLYCEMIA, WITHOUT LONG-TERM CURRENT USE OF INSULIN: Status: ACTIVE | Noted: 2023-04-09

## 2023-04-09 PROBLEM — Z93.1 PEG (PERCUTANEOUS ENDOSCOPIC GASTROSTOMY) STATUS: Status: ACTIVE | Noted: 2023-04-09

## 2023-04-09 LAB
BASOPHILS # BLD AUTO: 0.03 K/UL (ref 0–0.2)
BASOPHILS NFR BLD: 0.4 % (ref 0–1.9)
BILIRUB UR QL STRIP: NEGATIVE
C DIFF GDH STL QL: NEGATIVE
C DIFF TOX A+B STL QL IA: NEGATIVE
CLARITY UR: CLEAR
COLOR UR: YELLOW
DIFFERENTIAL METHOD: ABNORMAL
EOSINOPHIL # BLD AUTO: 0.2 K/UL (ref 0–0.5)
EOSINOPHIL NFR BLD: 2.6 % (ref 0–8)
ERYTHROCYTE [DISTWIDTH] IN BLOOD BY AUTOMATED COUNT: 14.4 % (ref 11.5–14.5)
GLUCOSE UR QL STRIP: NEGATIVE
HCT VFR BLD AUTO: 44.2 % (ref 40–54)
HGB BLD-MCNC: 15 G/DL (ref 14–18)
HGB UR QL STRIP: NEGATIVE
HYALINE CASTS #/AREA URNS LPF: 5 /LPF
IMM GRANULOCYTES # BLD AUTO: 0.03 K/UL (ref 0–0.04)
IMM GRANULOCYTES NFR BLD AUTO: 0.4 % (ref 0–0.5)
KETONES UR QL STRIP: ABNORMAL
LACTATE SERPL-SCNC: 0.9 MMOL/L (ref 0.5–2.2)
LEUKOCYTE ESTERASE UR QL STRIP: ABNORMAL
LYMPHOCYTES # BLD AUTO: 1.2 K/UL (ref 1–4.8)
LYMPHOCYTES NFR BLD: 14.3 % (ref 18–48)
MCH RBC QN AUTO: 31 PG (ref 27–31)
MCHC RBC AUTO-ENTMCNC: 33.9 G/DL (ref 32–36)
MCV RBC AUTO: 91 FL (ref 82–98)
MICROSCOPIC COMMENT: ABNORMAL
MONOCYTES # BLD AUTO: 0.3 K/UL (ref 0.3–1)
MONOCYTES NFR BLD: 3.5 % (ref 4–15)
NEUTROPHILS # BLD AUTO: 6.4 K/UL (ref 1.8–7.7)
NEUTROPHILS NFR BLD: 78.8 % (ref 38–73)
NITRITE UR QL STRIP: NEGATIVE
NRBC BLD-RTO: 0 /100 WBC
PH UR STRIP: 5 [PH] (ref 5–8)
PLATELET # BLD AUTO: 258 K/UL (ref 150–450)
PLATELET BLD QL SMEAR: ABNORMAL
PMV BLD AUTO: 9 FL (ref 9.2–12.9)
POCT GLUCOSE: 125 MG/DL (ref 70–110)
POCT GLUCOSE: 168 MG/DL (ref 70–110)
POCT GLUCOSE: 174 MG/DL (ref 70–110)
PROT UR QL STRIP: ABNORMAL
RBC # BLD AUTO: 4.84 M/UL (ref 4.6–6.2)
SP GR UR STRIP: 1.03 (ref 1–1.03)
UNIDENT CRYS URNS QL MICRO: ABNORMAL
URN SPEC COLLECT METH UR: ABNORMAL
UROBILINOGEN UR STRIP-ACNC: NEGATIVE EU/DL
WBC # BLD AUTO: 8.05 K/UL (ref 3.9–12.7)
WBC #/AREA URNS HPF: 1 /HPF (ref 0–5)
WBC CLUMPS URNS QL MICRO: ABNORMAL

## 2023-04-09 PROCEDURE — 96366 THER/PROPH/DIAG IV INF ADDON: CPT

## 2023-04-09 PROCEDURE — 63600175 PHARM REV CODE 636 W HCPCS: Performed by: NURSE PRACTITIONER

## 2023-04-09 PROCEDURE — 96376 TX/PRO/DX INJ SAME DRUG ADON: CPT

## 2023-04-09 PROCEDURE — 96361 HYDRATE IV INFUSION ADD-ON: CPT

## 2023-04-09 PROCEDURE — 25000003 PHARM REV CODE 250: Performed by: INTERNAL MEDICINE

## 2023-04-09 PROCEDURE — G0378 HOSPITAL OBSERVATION PER HR: HCPCS

## 2023-04-09 PROCEDURE — 63600175 PHARM REV CODE 636 W HCPCS: Performed by: INTERNAL MEDICINE

## 2023-04-09 PROCEDURE — 99223 1ST HOSP IP/OBS HIGH 75: CPT | Mod: ,,, | Performed by: INTERNAL MEDICINE

## 2023-04-09 PROCEDURE — 96372 THER/PROPH/DIAG INJ SC/IM: CPT | Performed by: INTERNAL MEDICINE

## 2023-04-09 PROCEDURE — 99223 PR INITIAL HOSPITAL CARE,LEVL III: ICD-10-PCS | Mod: ,,, | Performed by: INTERNAL MEDICINE

## 2023-04-09 PROCEDURE — 96365 THER/PROPH/DIAG IV INF INIT: CPT

## 2023-04-09 RX ORDER — GLUCAGON 1 MG
1 KIT INJECTION
Status: DISCONTINUED | OUTPATIENT
Start: 2023-04-09 | End: 2023-04-10 | Stop reason: HOSPADM

## 2023-04-09 RX ORDER — MAG HYDROX/ALUMINUM HYD/SIMETH 200-200-20
30 SUSPENSION, ORAL (FINAL DOSE FORM) ORAL EVERY 6 HOURS PRN
Status: DISCONTINUED | OUTPATIENT
Start: 2023-04-09 | End: 2023-04-10 | Stop reason: HOSPADM

## 2023-04-09 RX ORDER — GUAIFENESIN 100 MG/5ML
200 SOLUTION ORAL EVERY 4 HOURS PRN
Status: DISCONTINUED | OUTPATIENT
Start: 2023-04-09 | End: 2023-04-10 | Stop reason: HOSPADM

## 2023-04-09 RX ORDER — DIPHENHYDRAMINE HYDROCHLORIDE 50 MG/ML
6.25 INJECTION INTRAMUSCULAR; INTRAVENOUS EVERY 6 HOURS PRN
Status: DISCONTINUED | OUTPATIENT
Start: 2023-04-09 | End: 2023-04-10 | Stop reason: HOSPADM

## 2023-04-09 RX ORDER — SODIUM CHLORIDE AND POTASSIUM CHLORIDE 150; 900 MG/100ML; MG/100ML
INJECTION, SOLUTION INTRAVENOUS CONTINUOUS
Status: DISCONTINUED | OUTPATIENT
Start: 2023-04-09 | End: 2023-04-10

## 2023-04-09 RX ORDER — ACETAMINOPHEN 325 MG/1
650 TABLET ORAL EVERY 6 HOURS PRN
Status: DISCONTINUED | OUTPATIENT
Start: 2023-04-09 | End: 2023-04-10 | Stop reason: HOSPADM

## 2023-04-09 RX ORDER — DIPHENHYDRAMINE HCL 25 MG
25 CAPSULE ORAL EVERY 6 HOURS PRN
Status: DISCONTINUED | OUTPATIENT
Start: 2023-04-09 | End: 2023-04-09

## 2023-04-09 RX ORDER — ONDANSETRON 2 MG/ML
4 INJECTION INTRAMUSCULAR; INTRAVENOUS EVERY 8 HOURS PRN
Status: DISCONTINUED | OUTPATIENT
Start: 2023-04-09 | End: 2023-04-10 | Stop reason: HOSPADM

## 2023-04-09 RX ORDER — INSULIN ASPART 100 [IU]/ML
1-10 INJECTION, SOLUTION INTRAVENOUS; SUBCUTANEOUS EVERY 6 HOURS PRN
Status: DISCONTINUED | OUTPATIENT
Start: 2023-04-09 | End: 2023-04-10 | Stop reason: HOSPADM

## 2023-04-09 RX ORDER — LOPERAMIDE HYDROCHLORIDE 2 MG/1
2 CAPSULE ORAL 4 TIMES DAILY PRN
Status: DISCONTINUED | OUTPATIENT
Start: 2023-04-09 | End: 2023-04-10 | Stop reason: HOSPADM

## 2023-04-09 RX ORDER — MORPHINE SULFATE 4 MG/ML
2 INJECTION, SOLUTION INTRAMUSCULAR; INTRAVENOUS EVERY 4 HOURS PRN
Status: DISCONTINUED | OUTPATIENT
Start: 2023-04-09 | End: 2023-04-10

## 2023-04-09 RX ADMIN — MORPHINE SULFATE 2 MG: 4 INJECTION INTRAVENOUS at 08:04

## 2023-04-09 RX ADMIN — LOPERAMIDE HYDROCHLORIDE 2 MG: 2 CAPSULE ORAL at 03:04

## 2023-04-09 RX ADMIN — DIPHENHYDRAMINE HYDROCHLORIDE 6.25 MG: 50 INJECTION, SOLUTION INTRAMUSCULAR; INTRAVENOUS at 07:04

## 2023-04-09 RX ADMIN — LOPERAMIDE HYDROCHLORIDE 2 MG: 2 CAPSULE ORAL at 08:04

## 2023-04-09 RX ADMIN — DIPHENHYDRAMINE HYDROCHLORIDE 6.25 MG: 50 INJECTION, SOLUTION INTRAMUSCULAR; INTRAVENOUS at 08:04

## 2023-04-09 RX ADMIN — INSULIN ASPART 2 UNITS: 100 INJECTION, SOLUTION INTRAVENOUS; SUBCUTANEOUS at 06:04

## 2023-04-09 RX ADMIN — MORPHINE SULFATE 2 MG: 4 INJECTION INTRAVENOUS at 07:04

## 2023-04-09 RX ADMIN — SODIUM CHLORIDE AND POTASSIUM CHLORIDE: 9; 1.49 INJECTION, SOLUTION INTRAVENOUS at 04:04

## 2023-04-09 RX ADMIN — SODIUM CHLORIDE AND POTASSIUM CHLORIDE: 9; 1.49 INJECTION, SOLUTION INTRAVENOUS at 05:04

## 2023-04-09 NOTE — NURSING TRANSFER
Nursing Transfer Note      4/9/2023     Reason patient is being transferred: observation     Transfer From: ED    Transfer via stretcher    Transfer with cardiac monitoring    Transported by [employer] sent: none    Any special needs or follow-up needed: Cardiac monitoring     Chart send with patient: Yes    Notified: spouse    Patient reassessed at: 04/09/2023, 0800 (date, time)    Upon arrival to floor: cardiac monitor applied, patient oriented to room, call bell in reach, and bed in lowest position

## 2023-04-09 NOTE — ASSESSMENT & PLAN NOTE
-reports generalized weakness, near-syncope, without LOC.    -denies fever, chills.    -associated with recurrence of diarrhea (C diff negative today).    -continue IV fluids.    PT/OT evaluation as indicated.

## 2023-04-09 NOTE — CONSULTS
[facility] - Telemetry ([facility])  Gastroenterology  Consult Note    Patient Name: Pacheco Rivas  MRN: [id number]  Admission Date: 4/8/2023  Hospital Length of Stay: 0 days  Code Status: Prior   Attending Provider: Juan Justin MD   Consulting Provider: Arlyn Kelley MD  Primary Care Physician: Amarjit Dominguez MD  Principal Problem:Diarrhea    Inpatient consult to Gastroenterology  Consult performed by: Arlyn Kelley MD  Consult ordered by: Barrett Horn MD  Reason for consult: Diarrhea      Subjective:     HPI:  Patient is a 66 y/o WM with CAD, DMII, PEG tube dependence who was admitted for IVVD, hypotension. Patient was recently treated for recurrent C diff infection and then had subsequent FMT on 3/23/23. After this, he was placed onto antibiotics for a cavitary lung lesion and had diarrhea again along with a fall. He was seen at [facility] and still was positive for C diff (about 3 weeks ago) and was placed back onto oral vancomycin. He has continued with diarrhea after feedings and weakness.  In ED, patient tested negative for C diff here. GI consulted for further evaluation.       No new subjective & objective note has been filed under this hospital service since the last note was generated.    Assessment/Plan:     GI  * Diarrhea  - C diff now negative s/p FMT  - May be antibiotic related diarrhea since patient was initially improved post FMT until the antibiotics for lung lesion were started. Alternatively could be a postinfectious IBS  - As C diff is negative, ok to use antidiarrheals such as Imodium PRN  - Can also use cholestyramine BID-TID to see if this helps  - Nutrition consult for his feedings  - If patient is to remain on antibiotics for his lung lesion, can keep on oral vancomycin during that time frame for prophylaxis against recurrent C diff  - Follow-up any additional ID recs        Thank you for your consult. I will follow-up with patient. Please contact us if you have any additional  questions.    Arlyn Kelley MD  Gastroenterology  [facility] - Telemetry ([facility])
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
## 2023-04-09 NOTE — ASSESSMENT & PLAN NOTE
- C diff now negative s/p FMT  - May be antibiotic related diarrhea since patient was initially improved post FMT until the antibiotics for lung lesion were started. Alternatively could be a postinfectious IBS  - As C diff is negative, ok to use antidiarrheals such as Imodium PRN  - Can also use cholestyramine BID-TID to see if this helps  - Nutrition consult for his feedings  - If patient is to remain on antibiotics for his lung lesion, can keep on oral vancomycin during that time frame for prophylaxis against recurrent C diff  - Follow-up any additional ID recs
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
## 2023-04-09 NOTE — ASSESSMENT & PLAN NOTE
Patient's FSGs are uncontrolled due to hyperglycemia on current medication regimen.  Last A1c reviewed-   Lab Results   Component Value Date    HGBA1C 6.9 (H) 03/06/2023     Most recent fingerstick glucose reviewed- No results for input(s): POCTGLUCOSE in the last 24 hours.  Current correctional scale  Low  Maintain anti-hyperglycemic dose as follows-   Antihyperglycemics (From admission, onward)    None        Hold Oral hypoglycemics while patient is in the hospital.
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
## 2023-04-09 NOTE — HPI
Patient is a 68 y/o WM with CAD, DMII, PEG tube dependence who was admitted for IVVD, hypotension. Patient was recently treated for recurrent C diff infection and then had subsequent FMT on 3/23/23. After this, he was placed onto antibiotics for a cavitary lung lesion and had diarrhea again along with a fall. He was seen at [facility] and still was positive for C diff (about 3 weeks ago) and was placed back onto oral vancomycin. He has continued with diarrhea after feedings and weakness.  In ED, patient tested negative for C diff here. GI consulted for further evaluation.

## 2023-04-09 NOTE — ASSESSMENT & PLAN NOTE
Diarrhea improved with Imodium as needed.   Dietician said he would have the patient add a bulking fiber.   Continue vancomycin as long as antibiotics needed for his lungs.   Follow up with GI in a few weeks.   GI signing off. Re-consult as needed.

## 2023-04-09 NOTE — ASSESSMENT & PLAN NOTE
-recurrent diarrhea.    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 Meyer.    -followed by recurrence of diarrhea, remains on p.o. vancomycin at this time.    -consult GI for evaluation/recommendations.    -C diff toxin and antigen negative today.

## 2023-04-09 NOTE — PLAN OF CARE
"A218/A218 WALLACE Rivas is a 67 y.o.male admitted on 4/8/2023 for Diarrhea   Code Status: Prior MRN: [id number]   Review of patient's allergies indicates:   Allergen Reactions    Hydrocodone-acetaminophen Hives    Oxycodone-acetaminophen Hives     Past Medical History:   Diagnosis Date    CAD (coronary artery disease)     Congestive heart failure, unspecified     Diabetic nephropathy associated with type 2 diabetes mellitus     Diabetic peripheral neuropathy associated with type 2 diabetes mellitus     General anesthetics causing adverse effect in therapeutic use     States his heart stopped on the recovery table, had CPR and went home the same day    Hyperlipidemia associated with type 2 diabetes mellitus     Hypertension associated with diabetes     Hypothyroidism (acquired)     Kidney stones     Other "heavy-for-dates" infants     blood clots right arm-no b/p or sticks right arm      PRN meds    acetaminophen, 650 mg, Q6H PRN  aluminum-magnesium hydroxide-simethicone, 30 mL, Q6H PRN  dextrose 10%, 12.5 g, PRN  dextrose 10%, 25 g, PRN  diphenhydrAMINE, 6.25 mg, Q6H PRN  glucagon (human recombinant), 1 mg, PRN  guaiFENesin 100 mg/5 ml, 200 mg, Q4H PRN  insulin aspart U-100, 1-10 Units, Q6H PRN  morphine, 2 mg, Q4H PRN  ondansetron, 4 mg, Q8H PRN      Pt received from ED. Admit completed. Pt to bring tube feeding from home in can form. Dr. Justin notified and confirmed. Cardiac, lab, and glucose monitoring continues. Chart check completed. Will continue plan of care.               Lead Monitored: Lead II Rhythm: normal sinus rhythm Frequency/Ectopy: PVCs  Cardiac/Telemetry Box Number: [id number]  VTE Required Core Measure: Pharmacological prophylaxis initiated/maintained Last Bowel Movement: 04/08/23  No diet orders on file     Chucho Score: 22  Fall Risk Score: 16  Accucheck [x]   Freq? Q 6     Lines/Drains/Airways       Peripheral Intravenous Line  Duration                  Peripheral IV - Single Lumen 04/08/23 1560 " 20 G Right Antecubital <1 day
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
## 2023-04-09 NOTE — ASSESSMENT & PLAN NOTE
-per Care everywhere, patient was treated for this, evaluated at outside hospital.    -records indicate negative for TB.    -chronic appearing, patient denies SOB, fever, chills.    -follow up with ID

## 2023-04-09 NOTE — HPI
Mr. 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 Meyer.  Postprocedure, patient started complaining of diarrhea, was placed on p.o. vancomycin which remains at this time.  Presented to the ED today complaining of increasing diarrhea, generalized weakness, near syncope.  C diff toxin and antigen today is negative.  Patient was hypotensive initially BP 92/50, received 30 mL/kilogram NS bolus in the ED. BP improved to 110/55. Denies fever, chills.  Complains of abdominal discomfort.  CT chest abdomen pelvis without contrast reveals cavitary lesion right upper lobe 3-4 cm (known history of cavitary lung lesion) no acute intra-abdominal pathology.      Placed in observation for hypotension, volume depletion from recurrent diarrhea.  Status post fecal transplant for C diff on 3/23/23.

## 2023-04-10 ENCOUNTER — DOCUMENTATION ONLY (OUTPATIENT)
Dept: GASTROENTEROLOGY | Facility: HOSPITAL | Age: 68
End: 2023-04-10
Payer: MEDICARE

## 2023-04-10 VITALS
HEIGHT: 68 IN | SYSTOLIC BLOOD PRESSURE: 105 MMHG | WEIGHT: 121.06 LBS | BODY MASS INDEX: 18.35 KG/M2 | OXYGEN SATURATION: 100 % | RESPIRATION RATE: 18 BRPM | HEART RATE: 77 BPM | DIASTOLIC BLOOD PRESSURE: 55 MMHG | TEMPERATURE: 98 F

## 2023-04-10 PROBLEM — E44.0 MODERATE PROTEIN-CALORIE MALNUTRITION: Status: ACTIVE | Noted: 2023-04-09

## 2023-04-10 LAB
ALBUMIN SERPL BCP-MCNC: 2.8 G/DL (ref 3.5–5.2)
ALP SERPL-CCNC: 64 U/L (ref 55–135)
ALT SERPL W/O P-5'-P-CCNC: 19 U/L (ref 10–44)
ANION GAP SERPL CALC-SCNC: 8 MMOL/L (ref 8–16)
AST SERPL-CCNC: 23 U/L (ref 10–40)
BASOPHILS # BLD AUTO: 0.06 K/UL (ref 0–0.2)
BASOPHILS NFR BLD: 0.6 % (ref 0–1.9)
BILIRUB SERPL-MCNC: 0.4 MG/DL (ref 0.1–1)
BUN SERPL-MCNC: 15 MG/DL (ref 8–23)
CALCIUM SERPL-MCNC: 8.7 MG/DL (ref 8.7–10.5)
CHLORIDE SERPL-SCNC: 108 MMOL/L (ref 95–110)
CO2 SERPL-SCNC: 21 MMOL/L (ref 23–29)
CREAT SERPL-MCNC: 0.7 MG/DL (ref 0.5–1.4)
DIFFERENTIAL METHOD: ABNORMAL
EOSINOPHIL # BLD AUTO: 0.5 K/UL (ref 0–0.5)
EOSINOPHIL NFR BLD: 4.5 % (ref 0–8)
ERYTHROCYTE [DISTWIDTH] IN BLOOD BY AUTOMATED COUNT: 14.5 % (ref 11.5–14.5)
EST. GFR  (NO RACE VARIABLE): >60 ML/MIN/1.73 M^2
GLUCOSE SERPL-MCNC: 157 MG/DL (ref 70–110)
HCT VFR BLD AUTO: 31.9 % (ref 40–54)
HGB BLD-MCNC: 10.6 G/DL (ref 14–18)
IMM GRANULOCYTES # BLD AUTO: 0.06 K/UL (ref 0–0.04)
IMM GRANULOCYTES NFR BLD AUTO: 0.6 % (ref 0–0.5)
LYMPHOCYTES # BLD AUTO: 1.8 K/UL (ref 1–4.8)
LYMPHOCYTES NFR BLD: 17.5 % (ref 18–48)
MAGNESIUM SERPL-MCNC: 1.7 MG/DL (ref 1.6–2.6)
MCH RBC QN AUTO: 31 PG (ref 27–31)
MCHC RBC AUTO-ENTMCNC: 33.2 G/DL (ref 32–36)
MCV RBC AUTO: 93 FL (ref 82–98)
MONOCYTES # BLD AUTO: 0.5 K/UL (ref 0.3–1)
MONOCYTES NFR BLD: 4.9 % (ref 4–15)
NEUTROPHILS # BLD AUTO: 7.4 K/UL (ref 1.8–7.7)
NEUTROPHILS NFR BLD: 71.9 % (ref 38–73)
NRBC BLD-RTO: 0 /100 WBC
PLATELET # BLD AUTO: 313 K/UL (ref 150–450)
PMV BLD AUTO: 9.1 FL (ref 9.2–12.9)
POCT GLUCOSE: 217 MG/DL (ref 70–110)
POTASSIUM SERPL-SCNC: 4.8 MMOL/L (ref 3.5–5.1)
PROT SERPL-MCNC: 6.9 G/DL (ref 6–8.4)
RBC # BLD AUTO: 3.42 M/UL (ref 4.6–6.2)
SODIUM SERPL-SCNC: 137 MMOL/L (ref 136–145)
WBC # BLD AUTO: 10.24 K/UL (ref 3.9–12.7)

## 2023-04-10 PROCEDURE — G0378 HOSPITAL OBSERVATION PER HR: HCPCS

## 2023-04-10 PROCEDURE — 99232 SBSQ HOSP IP/OBS MODERATE 35: CPT | Mod: ,,, | Performed by: PHYSICIAN ASSISTANT

## 2023-04-10 PROCEDURE — 25000003 PHARM REV CODE 250: Performed by: INTERNAL MEDICINE

## 2023-04-10 PROCEDURE — 85025 COMPLETE CBC W/AUTO DIFF WBC: CPT | Performed by: INTERNAL MEDICINE

## 2023-04-10 PROCEDURE — 80053 COMPREHEN METABOLIC PANEL: CPT | Performed by: INTERNAL MEDICINE

## 2023-04-10 PROCEDURE — 36415 COLL VENOUS BLD VENIPUNCTURE: CPT | Performed by: INTERNAL MEDICINE

## 2023-04-10 PROCEDURE — 25000003 PHARM REV CODE 250: Performed by: NURSE PRACTITIONER

## 2023-04-10 PROCEDURE — 63600175 PHARM REV CODE 636 W HCPCS: Performed by: INTERNAL MEDICINE

## 2023-04-10 PROCEDURE — 99232 PR SUBSEQUENT HOSPITAL CARE,LEVL II: ICD-10-PCS | Mod: ,,, | Performed by: PHYSICIAN ASSISTANT

## 2023-04-10 PROCEDURE — 96366 THER/PROPH/DIAG IV INF ADDON: CPT

## 2023-04-10 PROCEDURE — 96376 TX/PRO/DX INJ SAME DRUG ADON: CPT

## 2023-04-10 PROCEDURE — 63600175 PHARM REV CODE 636 W HCPCS: Performed by: NURSE PRACTITIONER

## 2023-04-10 PROCEDURE — 83735 ASSAY OF MAGNESIUM: CPT | Performed by: INTERNAL MEDICINE

## 2023-04-10 RX ORDER — VANCOMYCIN HCL 50 MG/ML
125 SOLUTION, RECONSTITUTED, ORAL ORAL 4 TIMES DAILY
Qty: 150 ML | Refills: 0 | Status: SHIPPED | OUTPATIENT
Start: 2023-04-10 | End: 2023-04-27

## 2023-04-10 RX ORDER — METRONIDAZOLE 500 MG/1
500 TABLET ORAL 3 TIMES DAILY
Status: DISCONTINUED | OUTPATIENT
Start: 2023-04-10 | End: 2023-04-10 | Stop reason: HOSPADM

## 2023-04-10 RX ORDER — CEFDINIR 300 MG/1
300 CAPSULE ORAL 2 TIMES DAILY
Qty: 20 CAPSULE | Refills: 0 | Status: SHIPPED | OUTPATIENT
Start: 2023-04-10 | End: 2023-04-20

## 2023-04-10 RX ORDER — LEVOTHYROXINE SODIUM 25 UG/1
25 TABLET ORAL EVERY MORNING
Status: DISCONTINUED | OUTPATIENT
Start: 2023-04-10 | End: 2023-04-10 | Stop reason: HOSPADM

## 2023-04-10 RX ORDER — METRONIDAZOLE 500 MG/1
500 TABLET ORAL 3 TIMES DAILY
Qty: 30 TABLET | Refills: 0 | Status: SHIPPED | OUTPATIENT
Start: 2023-04-10 | End: 2023-04-20

## 2023-04-10 RX ORDER — NAPROXEN SODIUM 220 MG/1
81 TABLET, FILM COATED ORAL DAILY
Status: DISCONTINUED | OUTPATIENT
Start: 2023-04-10 | End: 2023-04-10 | Stop reason: HOSPADM

## 2023-04-10 RX ORDER — SUCRALFATE 1 G/10ML
1 SUSPENSION ORAL 4 TIMES DAILY
Status: DISCONTINUED | OUTPATIENT
Start: 2023-04-10 | End: 2023-04-10 | Stop reason: HOSPADM

## 2023-04-10 RX ORDER — ROPINIROLE 1 MG/1
1 TABLET, FILM COATED ORAL 3 TIMES DAILY
Status: DISCONTINUED | OUTPATIENT
Start: 2023-04-10 | End: 2023-04-10 | Stop reason: HOSPADM

## 2023-04-10 RX ORDER — GABAPENTIN 100 MG/1
200 CAPSULE ORAL 2 TIMES DAILY
Status: DISCONTINUED | OUTPATIENT
Start: 2023-04-10 | End: 2023-04-10 | Stop reason: HOSPADM

## 2023-04-10 RX ORDER — CEFDINIR 300 MG/1
300 CAPSULE ORAL 2 TIMES DAILY
Status: DISCONTINUED | OUTPATIENT
Start: 2023-04-10 | End: 2023-04-10 | Stop reason: HOSPADM

## 2023-04-10 RX ORDER — CLOPIDOGREL BISULFATE 75 MG/1
75 TABLET ORAL DAILY
Status: DISCONTINUED | OUTPATIENT
Start: 2023-04-10 | End: 2023-04-10 | Stop reason: HOSPADM

## 2023-04-10 RX ORDER — TAMSULOSIN HYDROCHLORIDE 0.4 MG/1
0.4 CAPSULE ORAL DAILY
Status: DISCONTINUED | OUTPATIENT
Start: 2023-04-10 | End: 2023-04-10 | Stop reason: HOSPADM

## 2023-04-10 RX ORDER — CHOLESTYRAMINE 4 G/9G
1 POWDER, FOR SUSPENSION ORAL 2 TIMES DAILY
Status: DISCONTINUED | OUTPATIENT
Start: 2023-04-10 | End: 2023-04-10

## 2023-04-10 RX ADMIN — DIPHENHYDRAMINE HYDROCHLORIDE 6.25 MG: 50 INJECTION, SOLUTION INTRAMUSCULAR; INTRAVENOUS at 07:04

## 2023-04-10 RX ADMIN — ASPIRIN 81 MG CHEWABLE TABLET 81 MG: 81 TABLET CHEWABLE at 09:04

## 2023-04-10 RX ADMIN — LOPERAMIDE HYDROCHLORIDE 2 MG: 2 CAPSULE ORAL at 07:04

## 2023-04-10 RX ADMIN — VANCOMYCIN HYDROCHLORIDE 125 MG: KIT at 12:04

## 2023-04-10 RX ADMIN — LEVOTHYROXINE SODIUM 25 MCG: 25 TABLET ORAL at 09:04

## 2023-04-10 RX ADMIN — TAMSULOSIN HYDROCHLORIDE 0.4 MG: 0.4 CAPSULE ORAL at 09:04

## 2023-04-10 RX ADMIN — METRONIDAZOLE 500 MG: 500 TABLET ORAL at 03:04

## 2023-04-10 RX ADMIN — SUCRALFATE 1 G: 1 SUSPENSION ORAL at 12:04

## 2023-04-10 RX ADMIN — ROPINIROLE HYDROCHLORIDE 1 MG: 1 TABLET, FILM COATED ORAL at 03:04

## 2023-04-10 RX ADMIN — CHOLESTYRAMINE 4 G: 4 POWDER, FOR SUSPENSION ORAL at 09:04

## 2023-04-10 RX ADMIN — ROPINIROLE HYDROCHLORIDE 1 MG: 1 TABLET, FILM COATED ORAL at 09:04

## 2023-04-10 RX ADMIN — INSULIN ASPART 4 UNITS: 100 INJECTION, SOLUTION INTRAVENOUS; SUBCUTANEOUS at 12:04

## 2023-04-10 RX ADMIN — GABAPENTIN 200 MG: 100 CAPSULE ORAL at 09:04

## 2023-04-10 RX ADMIN — SODIUM CHLORIDE AND POTASSIUM CHLORIDE: 9; 1.49 INJECTION, SOLUTION INTRAVENOUS at 04:04

## 2023-04-10 RX ADMIN — SUCRALFATE 1 G: 1 SUSPENSION ORAL at 09:04

## 2023-04-10 RX ADMIN — CEFDINIR 300 MG: 300 CAPSULE ORAL at 09:04

## 2023-04-10 RX ADMIN — MORPHINE SULFATE 2 MG: 4 INJECTION INTRAVENOUS at 07:04

## 2023-04-10 RX ADMIN — CLOPIDOGREL BISULFATE 75 MG: 75 TABLET ORAL at 09:04

## 2023-04-10 RX ADMIN — METRONIDAZOLE 500 MG: 500 TABLET ORAL at 09:04

## 2023-04-10 NOTE — PLAN OF CARE
[facility] - Telemetry (Hospital)  Discharge Final Note    Primary Care Provider: Amarjit Dominguez MD    Expected Discharge Date: 4/10/2023    Final Discharge Note (most recent)       Final Note - 04/10/23 1130          Final Note    Assessment Type Final Discharge Note     Anticipated Discharge Disposition Home-Health Care Svc        Post-Acute Status    Post-Acute Authorization Home Health     Home Health Status Referrals Sent     Coverage [employer]     Discharge Delays None known at this time                     Important Message from Medicare             Contact Info       Amarjit Dominguez MD   Specialty: Pediatrics   Relationship: PCP - General    [facility]  [address]  [place] [place] [address]   Phone: [phone]       Next Steps: Schedule an appointment as soon as possible for a visit in 3 day(s)    Instructions: call office and schedule a hospital follow up in 3-5 days    Amrit Amaro MD   Specialty: Gastroenterology    [facility]  [address]  [address]  [place] [facility] [address]   Phone: [phone]       Next Steps: Follow up on 5/18/2023    Instructions: @ 1:00 pm    Reynaldo Tang MD   Specialty: Pulmonary Disease    [address]  [address]  [place] [address]   Phone: [phone]       Next Steps: Follow up on 5/2/2023    Instructions: @ 8:00 am    Augustina Maldonado MD   Specialty: Infectious Diseases    [facility]  [address]  [facility] [address]   Phone: [phone]       Next Steps: Follow up on 4/20/2023    Instructions: @ 1:00 pm          DC Disposition: [facility]   Family Notified: Patient @ bedside  Transportation: family    Patient needed Home Health continued upon DC. Swer sent referral to  [facility] to resume care once Patient is Dc'd.     Patient has resources to schedule hospital follow up with non-[facility] PCP.

## 2023-04-10 NOTE — SUBJECTIVE & OBJECTIVE
Subjective:     Interval History: Patient reports feeling ok. He has had only one stool today. He said it was soft but not diarrhea. Denies nausea or vomiting. Has occasional abdominal cramping. He says he has been using Imodium.     Review of Systems   Constitutional:         See Interval History for daily ROS.    Objective:     Vital Signs (Most Recent):  Temp: 98.3 °F (36.8 °C) (04/10/23 0722)  Pulse: 76 (04/10/23 0722)  Resp: 16 (04/10/23 0722)  BP: (!) 110/59 (04/10/23 0722)  SpO2: 100 % (04/10/23 0722)   Vital Signs (24h Range):  Temp:  [97.3 °F (36.3 °C)-98.3 °F (36.8 °C)] 98.3 °F (36.8 °C)  Pulse:  [60-80] 76  Resp:  [14-19] 16  SpO2:  [95 %-100 %] 100 %  BP: (106-130)/(57-63) 110/59     Weight: 54.9 kg (121 lb 0.5 oz) (04/09/23 0821)  Body mass index is 18.4 kg/m².      Intake/Output Summary (Last 24 hours) at 4/10/2023 1109  Last data filed at 4/9/2023 1818  Gross per 24 hour   Intake 1169 ml   Output --   Net 1169 ml       Lines/Drains/Airways       Peripheral Intravenous Line  Duration                  Peripheral IV - Single Lumen 04/10/23 0240 22 G Anterior;Left Forearm <1 day                    Physical Exam  Constitutional:       General: He is not in acute distress.     Appearance: He is well-developed. He is not diaphoretic.   HENT:      Head: Normocephalic and atraumatic.   Eyes:      Extraocular Movements: Extraocular movements intact.   Cardiovascular:      Rate and Rhythm: Normal rate and regular rhythm.   Pulmonary:      Effort: Pulmonary effort is normal. No respiratory distress.      Breath sounds: Normal breath sounds. No wheezing.   Abdominal:      General: Bowel sounds are normal. There is no distension.      Palpations: Abdomen is soft.      Tenderness: There is no abdominal tenderness.   Neurological:      Mental Status: He is alert and oriented to person, place, and time.      Cranial Nerves: No cranial nerve deficit.   Psychiatric:         Behavior: Behavior normal.       Significant  Labs:  CBC:   Recent Labs   Lab 04/08/23  2245 04/10/23  0451   WBC 8.05 10.24   HGB 15.0 10.6*   HCT 44.2 31.9*    313     CMP:   Recent Labs   Lab 04/10/23  0451   *   CALCIUM 8.7   ALBUMIN 2.8*   PROT 6.9      K 4.8   CO2 21*      BUN 15   CREATININE 0.7   ALKPHOS 64   ALT 19   AST 23   BILITOT 0.4         Significant Imaging:  Imaging results within the past 24 hours have been reviewed.

## 2023-04-10 NOTE — HOSPITAL COURSE
66 y/o male admitting with persistent diarrhea. 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 He states he was told he had a cavitary lesion that was 2-3 cm several months ago. Repeat imaging at [facility] after his fall showed that the lesion was bigger, measuring 5 cm. He was then started on Omnicef and Flagyl by Infectious disease x 3 weeks. His diarrhea returned, he was started on PO vancomycin while taking antibiotics for his lesion.   He was found to be hypotensive on arrival, likely from IVVD. C-diff toxin/antigen negative. GI consulted, feels the diarrhea is likely caused by the antibiotics and to continue PO vanc until other antibiotic course completed.   Imaging revealed the RUL cavitary lesion 3-4 cm. He c/o being SOB with little exertion. Diarrhea improved with Imodium. BP improved with fluid hydration.   Dietician consulted for malnutrition.     Will discharge home with HH for continued management OP.   He has a follow up with Dr. Maldonado 4/20/23 @1 pm (infectious disease); Dr. Tang 5/2/23 @ 8 am (pulmonary); and Dr. Amaro 5/18/23 @1 pm (GI). He was informed to keep those appointments.   Follow up with PCP in 3-5 days for hospital follow up.    Patient seen and examined on the day of discharge.  All questions and concerns were addressed prior to discharge.    Face to face encounter with patient: 25 minutes

## 2023-04-10 NOTE — PLAN OF CARE
Nutrition Recs: 4/10  1. Recommend pt EN formula is modified to Diabetisource AC.   - Aim for goal rate of 60ml/hr.   - Formula provides 1728kcals, 86.4g Protein (104% EPN), 1175.04ml free water.   - 95ml fwf q4hr (570ml) or per MD/NP.   2. If Bolus feeds are preferred, Bolus 1 can, 250ml, x6 times/day.   - 1800kcal/day, 90g/day Protein (108% EPN), 1224ml free water.  - Bolus 50ml fwf before and after each feed (600ml/day) or per MD/NP   3. Recommend pt receives Banatrol plus TID via PEG to assist with creamy stool.   4. Weigh Daily.    Goals:   1. Pt TF formula will be modified within 24hrs.   2. Pt diarrhea will improve by RD follow up.  Nutrition Goal Status: new  Communication of RD Recs: other (comment) (POC: Sticky Note)  Tino Orlando, Registration Eligible, Provisional LDN

## 2023-04-10 NOTE — ASSESSMENT & PLAN NOTE
-reports generalized weakness, near-syncope, without LOC   -resolved  -likely IVVD, improved with IVF hydration  -associated with recurrence of diarrhea (C diff negative today)

## 2023-04-10 NOTE — PLAN OF CARE
[facility] - Tele[facility] (Hospital)  Initial Discharge Assessment       Primary Care Provider: Amarjit Dominguez MD    Admission Diagnosis: Weakness [R53.1]  Hypotension, unspecified hypotension type [I95.9]  Pneumonia of right upper lobe due to infectious organism [J18.9]    Admission Date: 4/8/2023  Expected Discharge Date:     Discharge Barriers Identified: None    Payor: HU[employer] MANAGED MEDICARE / Plan: [employer] MEDICARE HMO / Product Type: Capitation /     Extended Emergency Contact Information  Primary Emergency Contact: Demi Rivas  Address: [address] [place] [address]  Home Phone: [phone]  Mobile Phone: [phone]  Relation: Spouse    Discharge Plan A: Home with family         [id number] - [place], [place] - [address]  [address] [facility] [address]  Phone: [phone] Fax: [phone]    [facility] [id number] [place], [place] - [address] AT [facility] [address] [facility]  [address]  [facility] [address]  Phone: [phone] Fax: [phone]      Initial Assessment (most recent)       Adult Discharge Assessment - 04/10/23 0959          Discharge Assessment    Assessment Type Discharge Planning Assessment     Confirmed/corrected address, phone number and insurance Yes     Confirmed Demographics Correct on Facesheet     Source of Information patient     Communicated BUBBA with patient/caregiver Date not available/Unable to determine     Reason For Admission Diarrhea     People in Home spouse     Facility Arrived From: home     Do you expect to return to your current living situation? Yes     Do you have help at home or someone to help you manage your care at home? Yes     Who are your caregiver(s) and their phone number(s)? spouse     Prior to hospitilization cognitive status: Alert/Oriented     Current cognitive status: Alert/Oriented     Home Accessibility stairs to enter home     Number of Stairs, Main  Entrance two     Surface of Stairs, Main Entrance hardwood     Stair Railings, Main Entrance none     Landing, Stairs, Main Entrance no railings;adequate turning radius     Equipment Currently Used at Home none     Readmission within 30 days? No     Patient currently being followed by outpatient case management? No     Do you currently have service(s) that help you manage your care at home? No     Do you take prescription medications? Yes     Do you have prescription coverage? Yes     Coverage [employer]     Do you have any problems affording any of your prescribed medications? No     Is the patient taking medications as prescribed? yes     Who is going to help you get home at discharge? spouse     How do you get to doctors appointments? car, drives self     Are you on dialysis? No     Do you take coumadin? No     Discharge Plan A Home with family     DME Needed Upon Discharge  none     Discharge Plan discussed with: Patient     Discharge Barriers Identified None                   Anticipated DC dispo: home with family  Prior Level of Function: independent with ADLs  PCP: Amarjit Dominguez MD    Comments:  Swer met with patient at bedside to introduce role and discuss discharge planning. Patient lives with spouse who will also be help at home and can provide transport at time of discharge. CM discharge needs depends on hospital progress. Swer will continue following to assist with other needs.

## 2023-04-10 NOTE — ASSESSMENT & PLAN NOTE
-recurrent diarrhea.    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 Meyer.    -followed by recurrence of diarrhea, likely r/t antibiotics  -C-diff toxin and antigen negative  -GI consulted recommended to cont PO vancomycin while on antibiotics for lung cavitary lesion  -follow up with GI OP  -cont NPO and tube feedings OP

## 2023-04-10 NOTE — PLAN OF CARE
Problem: Adult Inpatient Plan of Care  Goal: Plan of Care Review  Outcome: Ongoing, Progressing     Problem: Diabetes Comorbidity  Goal: Blood Glucose Level Within Targeted Range  Outcome: Ongoing, Progressing  Intervention: Monitor and Manage Glycemia  Flowsheets (Taken 4/10/2023 0357)  Glycemic Management: blood glucose monitored     Problem: Fall Injury Risk  Goal: Absence of Fall and Fall-Related Injury  Outcome: Ongoing, Progressing  Intervention: Identify and Manage Contributors  Flowsheets (Taken 4/10/2023 0357)  Medication Review/Management: medications reviewed  Intervention: Promote Injury-Free Environment  Flowsheets (Taken 4/10/2023 0357)  Safety Promotion/Fall Prevention:   assistive device/personal item within reach   side rails raised x 2   Fall Risk reviewed with patient/family

## 2023-04-10 NOTE — DISCHARGE SUMMARY
[facility] Medicine  Discharge Summary      Patient Name: Pacheco Rivas  MRN: [id number]  [facility]: [id number]  Patient Class: OP- Observation  Admission Date: 4/8/2023  Hospital Length of Stay: 0 days  Discharge Date and Time: No discharge date for patient encounter.  Attending Physician: Minna Rush MD   Discharging Provider: Margarita Osullivan NP  Primary Care Provider: Amarjit Dominguez MD    Primary Care Team: Networked reference to record PCT     HPI:   Mr. 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 Meyer.  Postprocedure, patient started complaining of diarrhea, was placed on p.o. vancomycin which remains at this time.  Presented to the ED today complaining of increasing diarrhea, generalized weakness, near syncope.  C diff toxin and antigen today is negative.  Patient was hypotensive initially BP 92/50, received 30 mL/kilogram NS bolus in the ED. BP improved to 110/55. Denies fever, chills.  Complains of abdominal discomfort.  CT chest abdomen pelvis without contrast reveals cavitary lesion right upper lobe 3-4 cm (known history of cavitary lung lesion) no acute intra-abdominal pathology.      Placed in observation for hypotension, volume depletion from recurrent diarrhea.  Status post fecal transplant for C diff on 3/23/23.      * No surgery found *      Hospital Course:   66 y/o male admitting with persistent diarrhea. Patient reports his diarrhea had resolved x 4 days after his fecal transplant on 3/23/23 until he fell and went to [facility] and started on antibiotics. He states he was told he had a cavitary lesion that was 2-3 cm several months ago. Repeat imaging at [facility] after his fall showed that the lesion was bigger, measuring 5 cm. He was then started on Omnicef and Flagyl by Infectious disease x 3 weeks. His diarrhea returned, he was started on PO vancomycin while taking antibiotics for his  lesion.   He was found to be hypotensive on arrival, likely from IVVD. C-diff toxin/antigen negative. GI consulted, feels the diarrhea is likely caused by the antibiotics and to continue PO vanc until other antibiotic course completed.   Imaging revealed the RUL cavitary lesion 3-4 cm. He c/o being SOB with little exertion. Diarrhea improved with Imodium. BP improved with fluid hydration.   Dietician consulted for malnutrition.     Will discharge home with HH for continued management OP.   He has a follow up with Dr. Maldonado 4/20/23 @1 pm (infectious disease); Dr. Tang 5/2/23 @ 8 am (pulmonary); and Dr. Amaro 5/18/23 @1 pm (GI). He was informed to keep those appointments.   Follow up with PCP in 3-5 days for hospital follow up.    Patient seen and examined on the day of discharge.  All questions and concerns were addressed prior to discharge.    Face to face encounter with patient: 25 minutes         Goals of Care Treatment Preferences:  Code Status: Full Code      Consults:   Consults (From admission, onward)          Status Ordering Provider     Inpatient consult to Registered Dietitian/Nutritionist  Once        Provider:  (Not yet assigned)    Completed SEGUNDO JIMENEZ     Inpatient consult to Gastroenterology  Once        Provider:  Arlyn Kelley MD    Completed BAHMAN MATOS     Inpatient consult to Infectious Diseases  Once        Provider:  Chidi Phillips MD, [facility]    Completed BAHMAN MATOS            Pulmonary  Cavitating mass of lung  -per Care everywhere, patient was treated for this, evaluated at outside hospital.    -records indicate negative for TB.    -chronic appearing  -follow up with ID OP, has appt scheduled 4/20 with Dr. Maldonado from [facility]  -cont Omnicef and Flagyl OP  -f/u with Pulmonology Dr. Tang 5/2    Endocrine  Moderate protein-calorie malnutrition  -dietitian consulted  -albumin 2.8  -cont TF recommendations, increased water intake per PEG    Type 2 diabetes mellitus with  hyperglycemia, without long-term current use of insulin  Patient's FSGs are uncontrolled due to hyperglycemia on current medication regimen.  Last A1c reviewed-   Lab Results   Component Value Date    HGBA1C 6.9 (H) 03/06/2023     Most recent fingerstick glucose reviewed-   Recent Labs   Lab 04/09/23  1607 04/10/23  1139   POCTGLUCOSE 174* 217*     Current correctional scale  Low  Maintain anti-hyperglycemic dose as follows-   Antihyperglycemics (From admission, onward)      Start     Stop Route Frequency Ordered    04/09/23 0703  insulin aspart U-100 pen 1-10 Units         -- SubQ Every 6 hours PRN 04/09/23 0603          Hold Oral hypoglycemics while patient is in the hospital.    GI  * Diarrhea  -recurrent diarrhea.    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 Meyer.    -followed by recurrence of diarrhea, likely r/t antibiotics  -C-diff toxin and antigen negative  -GI consulted recommended to cont PO vancomycin while on antibiotics for lung cavitary lesion  -follow up with GI OP  -cont NPO and tube feedings OP    PEG (percutaneous endoscopic gastrostomy) status  -cont TF per recommendations    Other  Generalized weakness  -reports generalized weakness, near-syncope, without LOC   -resolved  -likely IVVD, improved with IVF hydration  -associated with recurrence of diarrhea (C diff negative today)        Final Active Diagnoses:    Diagnosis Date Noted POA    PRINCIPAL PROBLEM:  Diarrhea [R19.7] 04/09/2023 Yes    Cavitating mass of lung [J98.4] 04/09/2023 Yes    Type 2 diabetes mellitus with hyperglycemia, without long-term current use of insulin [E11.65] 04/09/2023 Yes    Generalized weakness [R53.1] 04/09/2023 Yes    Moderate protein-calorie malnutrition [E44.0] 04/09/2023 Yes    PEG (percutaneous endoscopic gastrostomy) status [Z93.1] 04/09/2023 Not Applicable      Problems Resolved During this Admission:       Discharged Condition: good    Disposition: Home or Self Care    Follow Up:   Follow-up  Information       Amarjit Dominguez MD. Schedule an appointment as soon as possible for a visit in 3 day(s).    Specialty: Pediatrics  Why: call office and schedule a hospital follow up in 3-5 days  Contact information:  [address] [place] [address]  [phone]               Amrit Amaro MD Follow up on 5/18/2023.    Specialty: Gastroenterology  Why: @ 1:00 pm  Contact information:  [address] [facility]  [address]  [place] [address]  [phone]               Reynaldo Tang MD Follow up on 5/2/2023.    Specialty: Pulmonary Disease  Why: @ 8:00 am  Contact information:  [address] [facility]  [address]  [place]  [facility] [address]  [phone]               Augustina Maldonado MD Follow up on 4/20/2023.    Specialty: Infectious Diseases  Why: @ 1:00 pm  Contact information:  [address]  [place] [facility] [facility] [id number] [phone]               [facility]. Call in 3 day(s).    Specialty: Home Health Services  Why: home health  If [facility] has not reached out within 3 days of dischage, give them a call.  Contact information:  [address] [facility] [address] [phone]                           Patient Instructions:      Ambulatory referral/consult to Home Health   Standing Status: Future   Referral Priority: Routine Referral Type: Home Health   Referral Reason: Specialty Services Required   Requested Specialty: Home Health Services   Number of Visits Requested: 1     Diet Cardiac     Diet diabetic   Order Comments: Diabeticsource AC, 1 can, 250 ml x 6 times per day (1800 kcal/day, 90 g protein/day, 1224 ml free water. Bolus 50 ml free water before and after each feed (600 ml/day)  Banatrol plus TID via PEG to assist with creamy stools     Activity as tolerated       Significant Diagnostic Studies: Labs: BMP:   Recent Labs   Lab 04/08/23  2245 04/10/23  0451   * 157*   * 137   K 4.1 4.8    108   CO2 20* 21*   BUN  22 15   CREATININE 1.1 0.7   CALCIUM 9.4 8.7   MG  --  1.7   , CMP   Recent Labs   Lab 04/08/23  2245 04/10/23  0451   * 137   K 4.1 4.8    108   CO2 20* 21*   * 157*   BUN 22 15   CREATININE 1.1 0.7   CALCIUM 9.4 8.7   PROT 7.8 6.9   ALBUMIN 3.2* 2.8*   BILITOT 0.3 0.4   ALKPHOS 71 64   AST 16 23   ALT 19 19   ANIONGAP 12 8   , INR   Lab Results   Component Value Date    INR 1.0 07/16/2015    INR 1.0 07/16/2015    INR 1.0 07/16/2015   , Lipid Panel   Lab Results   Component Value Date    CHOL 105 (L) 07/17/2015    HDL 27 (L) 07/17/2015    LDLCALC 49.0 (L) 07/17/2015    TRIG 145 07/17/2015    CHOLHDL 25.7 07/17/2015   , Troponin   Recent Labs   Lab 04/04/23  1525 04/08/23  2245   TROPONINI <0.006 <0.006   , and A1C:   Recent Labs   Lab 11/28/22  1843 03/06/23  1141   HGBA1C 7.0* 6.9*     Microbiology: Blood Culture   Lab Results   Component Value Date    LABBLOO No Growth to date 04/08/2023    LABBLOO No Growth to date 04/08/2023    LABBLOO No Growth to date 04/08/2023    LABBLOO No Growth to date 04/08/2023     Radiology: X-Ray: CXR: X-Ray Chest 1 View (CXR): No results found for this visit on 04/08/23.    Pending Diagnostic Studies:       None           Medications:  Reconciled Home Medications:      Medication List        CHANGE how you take these medications      cefdinir 300 MG capsule  Commonly known as: OMNICEF  1 capsule (300 mg total) by Per G Tube route 2 (two) times daily. for 10 days  What changed: how to take this     metroNIDAZOLE 500 MG tablet  Commonly known as: FLAGYL  1 tablet (500 mg total) by Per G Tube route 3 (three) times daily. for 10 days  What changed: how to take this     vancomycin 50 mg/mL Solr  2.5 mLs (125 mg total) by PEG Tube route 4 (four) times daily. for 10 days  What changed: how to take this            CONTINUE taking these medications      aspirin 81 MG Chew  Take 81 mg by mouth once daily.     atorvastatin 40 MG tablet  Commonly known as: LIPITOR  Take 40 mg  by mouth every evening.     carvediloL 12.5 MG tablet  Commonly known as: COREG  Take 12.5 mg by mouth 2 (two) times daily.     cholestyramine 4 gram packet  Commonly known as: QUESTRAN  Take 4 g by mouth 2 (two) times daily.     clopidogreL 75 mg tablet  Commonly known as: PLAVIX  Take 75 mg by mouth once daily.     ergocalciferol 200 mcg/mL (8,000 unit/mL) Drop  Commonly known as: DRISDOL  Take 8,000 Units by mouth once daily.     fluticasone-umeclidin-vilanter 100-62.5-25 mcg Dsdv  Commonly known as: TRELEGY ELLIPTA  Take 1 puff by mouth once daily.     gabapentin 250 mg/5 mL solution  Commonly known as: NEURONTIN  Take 5 mLs by mouth 2 (two) times a day.     insulin glargine-lixisenatide 100 unit-33 mcg/mL Inpn pen  Commonly known as: SOLIQUA 100/33  Inject 50 Units into the skin every morning.     levothyroxine 25 MCG tablet  Commonly known as: SYNTHROID  Take 25 mcg by mouth every morning.     meclizine 25 mg tablet  Commonly known as: ANTIVERT  Take 1 tablet by mouth 3 (three) times daily as needed.     metFORMIN 500 MG ER 24hr tablet  Commonly known as: GLUCOPHAGE-XR  Take 500 mg by mouth every evening.     midodrine 2.5 MG Tab  Commonly known as: PROAMATINE  Take 1 tablet by mouth 2 (two) times a day.     ondansetron 4 MG Tbdl  Commonly known as: ZOFRAN-ODT  Take 1 tablet (4 mg total) by mouth every 6 (six) hours as needed (prn).     ranolazine 500 MG Tb12  Commonly known as: RANEXA  Take 1 tablet (500 mg total) by mouth 2 (two) times daily.     rOPINIRole 1 MG tablet  Commonly known as: REQUIP  Take 1 tablet by mouth 3 (three) times daily.     sucralfate 100 mg/mL suspension  Commonly known as: CARAFATE  10 mLs by Per G Tube route 4 (four) times daily.     tamsulosin 0.4 mg Cap  Commonly known as: FLOMAX  Take 0.4 mg by mouth once daily.              Indwelling Lines/Drains at time of discharge:   Lines/Drains/Airways       None                   Time spent on the discharge of patient: 40 minutes          Margarita Osullivan NP  Department of Hospital Medicine  [facility] - Telemetry ([facility])

## 2023-04-10 NOTE — ASSESSMENT & PLAN NOTE
-per Care everywhere, patient was treated for this, evaluated at outside hospital.    -records indicate negative for TB.    -chronic appearing  -follow up with ID OP, has appt scheduled 4/20 with Dr. Maldonado from [facility]  -cont Omnicef and Flagyl OP  -f/u with Pulmonology Dr. Tang 5/2

## 2023-04-10 NOTE — PROGRESS NOTES
[facility] - Telemetry ([facility])  Gastroenterology  Progress Note    Patient Name: Pacheco Rivas  MRN: [id number]  Admission Date: 4/8/2023  Hospital Length of Stay: 0 days  Code Status: Full Code   Attending Provider: Minna Rush MD  Consulting Provider: Samuel Kramer PA-C  Primary Care Physician: Amarjit Dominguez MD  Principal Problem: Diarrhea      Subjective:     Interval History: Patient reports feeling ok. He has had only one stool today. He said it was soft but not diarrhea. Denies nausea or vomiting. Has occasional abdominal cramping. He says he has been using Imodium.     Review of Systems   Constitutional:         See Interval History for daily ROS.    Objective:     Vital Signs (Most Recent):  Temp: 98.3 °F (36.8 °C) (04/10/23 0722)  Pulse: 76 (04/10/23 0722)  Resp: 16 (04/10/23 0722)  BP: (!) 110/59 (04/10/23 0722)  SpO2: 100 % (04/10/23 0722)   Vital Signs (24h Range):  Temp:  [97.3 °F (36.3 °C)-98.3 °F (36.8 °C)] 98.3 °F (36.8 °C)  Pulse:  [60-80] 76  Resp:  [14-19] 16  SpO2:  [95 %-100 %] 100 %  BP: (106-130)/(57-63) 110/59     Weight: 54.9 kg (121 lb 0.5 oz) (04/09/23 0821)  Body mass index is 18.4 kg/m².      Intake/Output Summary (Last 24 hours) at 4/10/2023 1109  Last data filed at 4/9/2023 1818  Gross per 24 hour   Intake 1169 ml   Output --   Net 1169 ml       Lines/Drains/Airways       Peripheral Intravenous Line  Duration                  Peripheral IV - Single Lumen 04/10/23 0240 22 G Anterior;Left Forearm <1 day                    Physical Exam  Constitutional:       General: He is not in acute distress.     Appearance: He is well-developed. He is not diaphoretic.   HENT:      Head: Normocephalic and atraumatic.   Eyes:      Extraocular Movements: Extraocular movements intact.   Cardiovascular:      Rate and Rhythm: Normal rate and regular rhythm.   Pulmonary:      Effort: Pulmonary effort is normal. No respiratory distress.      Breath sounds: Normal breath sounds. No wheezing.    Abdominal:      General: Bowel sounds are normal. There is no distension.      Palpations: Abdomen is soft.      Tenderness: There is no abdominal tenderness.   Neurological:      Mental Status: He is alert and oriented to person, place, and time.      Cranial Nerves: No cranial nerve deficit.   Psychiatric:         Behavior: Behavior normal.       Significant Labs:  CBC:   Recent Labs   Lab 04/08/23  2245 04/10/23  0451   WBC 8.05 10.24   HGB 15.0 10.6*   HCT 44.2 31.9*    313     CMP:   Recent Labs   Lab 04/10/23  0451   *   CALCIUM 8.7   ALBUMIN 2.8*   PROT 6.9      K 4.8   CO2 21*      BUN 15   CREATININE 0.7   ALKPHOS 64   ALT 19   AST 23   BILITOT 0.4         Significant Imaging:  Imaging results within the past 24 hours have been reviewed.    Assessment/Plan:     GI  * Diarrhea  Diarrhea improved with Imodium as needed.   Dietician said he would have the patient add a bulking fiber.   Continue vancomycin as long as antibiotics needed for his lungs.   Follow up with GI in a few weeks.   GI signing off. Re-consult as needed.         Thank you for your consult. I will sign off. Please contact us if you have any additional questions.    Samuel Kramer PA-C  Gastroenterology  [facility] - Telemetry ([facility])

## 2023-04-10 NOTE — CONSULTS
[facility] - Telemetry ([facility])  Adult Nutrition  Consult Note    SUMMARY     Recommendations  1. Recommend pt EN formula is modified to Diabetisource AC.   - Aim for goal rate of 60ml/hr.   - Formula provides 1728kcals, 86.4g Protein (104% EPN), 1175.04ml free water.   - 95ml fwf q4hr (570ml) or per MD/NP.   2. If Bolus feeds are preferred, Bolus 1 can, 250ml, x6 times/day.   - 1800kcal/day, 90g/day Protein (108% EPN), 1224ml free water.  - Bolus 50ml fwf before and after each feed (600ml/day) or per MD/NP   3. Recommend pt receives Banatrol plus TID via PEG to assist with creamy stool.   4. Weigh Daily.    Goals:   1. Pt TF formula will be modified within 24hrs.   2. Pt diarrhea will improve by RD follow up.  Nutrition Goal Status: new  Communication of RD Recs: other (comment) (POC: Sticky Note)    Assessment and Plan    Nutrition Problem:  Moderate Protein-Calorie Malnutrition  Malnutrition in the context of Acute Illness/Injury    Related to (etiology):  Increased demand for nutrition    Signs and Symptoms (as evidenced by):  Body Fat Depletion: mild and moderate depletion of orbitals and thoracic and lumbar region   Muscle Mass Depletion: mild and moderate depletion of temples, clavicle region, scapular region, and interosseous muscle     Nutrition Diagnosis Status:  New      Malnutrition Assessment  Malnutrition Type: acute illness or injury          Subcutaneous Fat (Malnutrition): moderate depletion  Muscle Mass (Malnutrition): severe depletion   Orbital Region (Subcutaneous Fat Loss): moderate depletion  Upper Arm Region (Subcutaneous Fat Loss): moderate depletion  Thoracic and Lumbar Region: moderate depletion   [place] Region (Muscle Loss): moderate depletion  Clavicle Bone Region (Muscle Loss): moderate depletion  Clavicle and Acromion Bone Region (Muscle Loss): moderate depletion  Scapular Bone Region (Muscle Loss): moderate depletion  Dorsal Hand (Muscle Loss): mild depletion  Patellar Region (Muscle  Loss): moderate depletion       Subcutaneous Fat Loss (Final Summary): mild protein-calorie malnutrition  Muscle Loss Evaluation (Final Summary): moderate protein-calorie malnutrition         Reason for Assessment    Reason For Assessment: consult, identified at risk by screening criteria  Diagnosis: gastrointestinal disease  Relevant Medical History: Diarrhea, Mass on lung, T2DM, underweight, PEG, CHF, HLD, HTN  Interdisciplinary Rounds: did not attend  General Information Comments:   4/10: 67 y.o male admitted w/ current principal problem of diarrhea. Pt has PEG placed. Pt states that he uses Vital AF formula x6/day at home. Pt states that he wishes not to do continues feeds, rather do bolus feeds. Also, the pt wishes to use his home formula which he has had someone bring him from. NFPE preformed, moderate and severe muscle wasting and fat depletion notices. Current charted wt for pt is 121lbs, BMI 18.40 (Underweight for age) Pt states that his LBM 4/10, states that stool is creamy. Pt states that his creamy stools has been chronic. Pt Chucho score is 19. All pertinent labs and medications reviewed. Dietitian will continue to monitor the pt TF tolerances, creamy stools, weight status, and nutrition labs.  Nutrition Discharge Planning: PEG    Wt Readings from Last 20 Encounters:   04/10/23 54.9 kg (121 lb 0.5 oz)   04/04/23 52.1 kg (114 lb 13.8 oz)   04/04/23 52.3 kg (115 lb 4.8 oz)   03/25/23 52.6 kg (116 lb)   03/23/23 52.6 kg (116 lb)   03/04/23 57.6 kg (127 lb)   01/29/23 57.6 kg (127 lb)   11/29/18 90.2 kg (198 lb 13.7 oz)   07/12/16 90.2 kg (198 lb 13.7 oz)   01/05/16 92.8 kg (204 lb 9.4 oz)   10/13/15 94.1 kg (207 lb 7.3 oz)   10/02/15 91.3 kg (201 lb 3.2 oz)   08/26/15 90.9 kg (200 lb 8 oz)   08/24/15 92.8 kg (204 lb 8 oz)   07/21/15 92.1 kg (203 lb)   07/17/15 95.7 kg (210 lb 15.7 oz)   07/09/15 90.7 kg (200 lb)   07/01/15 94.7 kg (208 lb 11.2 oz)   06/18/15 95.9 kg (211 lb 6.4 oz)   06/17/15 95.7 kg (211  "lb)   ]  Nutrition Risk Screen    Nutrition Risk Screen: difficulty chewing/swallowing, tube feeding or parenteral nutrition    Nutrition/Diet History    Spiritual, Cultural Beliefs, Confucianism Practices, Values that Affect Care: no  Food Allergies: NKFA  Factors Affecting Nutritional Intake: altered gastrointestinal function, diarrhea, difficulty/impaired swallowing    Anthropometrics    Temp: 98.3 °F (36.8 °C)  Height Method: Stated  Height: 5' 8" (172.7 cm)  Height (inches): 68 in  Weight Method: Bed Scale  Weight: 54.9 kg (121 lb 0.5 oz)  Weight (lb): 121.03 lb  Ideal Body Weight (IBW), Male: 154 lb  % Ideal Body Weight, Male (lb): 78.59 %  BMI (Calculated): 18.4  BMI Grade: 17 - 18.4 protein-energy malnutrition grade I       Lab/Procedures/Meds  BMP  Lab Results   Component Value Date     04/10/2023    K 4.8 04/10/2023     04/10/2023    CO2 21 (L) 04/10/2023    BUN 15 04/10/2023    CREATININE 0.7 04/10/2023    CALCIUM 8.7 04/10/2023    ANIONGAP 8 04/10/2023    EGFRNORACEVR >60 04/10/2023       Pertinent Labs Reviewed: reviewed  Pertinent Medications Reviewed: reviewed  Scheduled Meds:   aspirin  81 mg Oral Daily    cefdinir  300 mg Oral BID    clopidogreL  75 mg Oral Daily    gabapentin  200 mg Oral BID    levothyroxine  25 mcg Oral QAM    metroNIDAZOLE  500 mg Oral TID    rOPINIRole  1 mg Oral TID    sucralfate  1 g Per G Tube QID    tamsulosin  0.4 mg Oral Daily    vancomycin  125 mg Oral Q6H     Continuous Infusions:  PRN Meds:.acetaminophen, aluminum-magnesium hydroxide-simethicone, dextrose 10%, dextrose 10%, diphenhydrAMINE, glucagon (human recombinant), guaiFENesin 100 mg/5 ml, insulin aspart U-100, loperamide, ondansetron    Physical Findings/Assessment         Estimated/Assessed Needs    Weight Used For Calorie Calculations: 54.9 kg (121 lb 0.5 oz)  Energy Calorie Requirements (kcal): 5371-6966 (30-35kcal/kg per Underweight)  Energy Need Method: Kcal/kg  Protein Requirements: 66-83 " (1.2-1.5g/kg per Underweight)  Weight Used For Protein Calculations: 54.9 kg (121 lb 0.5 oz)  Fluid Requirements (mL): 2985-3439  Estimated Fluid Requirement Method: RDA Method  RDA Method (mL): 1647  CHO Requirement: 206-240      Nutrition Prescription Ordered    Current Diet Order: NPO  Current Nutrition Support Formula Ordered: Peptamen AF  Current Nutrition Support Rate Ordered: 60 (ml)    Evaluation of Received Nutrient/Fluid Intake    Enteral Calories (kcal): 1728  Enteral Protein (gm): 109.44  Enteral (Free Water) Fluid (mL): 1169.28  Total Calories (kcal/kg): 1728  % Kcal Needs: 100  % Protein Needs: 132  I/O: 2669ml Since admit  Energy Calories Required: meeting needs  Protein Required: exceeds needs  Fluid Required: not meeting needs  Tolerance: not tolerating  % Intake of Estimated Energy Needs: 75 - 100 %  % Meal Intake: NPO PEG Bolus x6 cans/day    Nutrition Risk    Level of Risk/Frequency of Follow-up: low       Monitor and Evaluation    Food and Nutrient Intake: energy intake, enteral nutrition intake  Food and Nutrient Adminstration: enteral and parenteral nutrition administration  Knowledge/Beliefs/Attitudes: food and nutrition knowledge/skill, beliefs and attitudes  Physical Activity and Function: nutrition-related ADLs and IADLs, factors affecting access to physical activity  Anthropometric Measurements: weight, weight change, body mass index  Biochemical Data, Medical Tests and Procedures: electrolyte and renal panel, gastrointestinal profile, glucose/endocrine profile, inflammatory profile, lipid profile  Nutrition-Focused Physical Findings: overall appearance, skin       Nutrition Follow-Up    RD Follow-up?: Yes  Tino Orlando, Registration Eligible, Provisional LDN

## 2023-04-10 NOTE — PLAN OF CARE
"A218/A218 WALLACE Rivas is a 67 y.o.male admitted on 4/8/2023 for Diarrhea   Code Status: Full Code MRN: [id number]   Review of patient's allergies indicates:   Allergen Reactions    Hydrocodone-acetaminophen Hives    Oxycodone-acetaminophen Hives     Past Medical History:   Diagnosis Date    CAD (coronary artery disease)     Congestive heart failure, unspecified     Diabetic nephropathy associated with type 2 diabetes mellitus     Diabetic peripheral neuropathy associated with type 2 diabetes mellitus     General anesthetics causing adverse effect in therapeutic use     States his heart stopped on the recovery table, had CPR and went home the same day    Hyperlipidemia associated with type 2 diabetes mellitus     Hypertension associated with diabetes     Hypothyroidism (acquired)     Kidney stones     Other "heavy-for-dates" infants     blood clots right arm-no b/p or sticks right arm      PRN meds    acetaminophen, 650 mg, Q6H PRN  aluminum-magnesium hydroxide-simethicone, 30 mL, Q6H PRN  dextrose 10%, 12.5 g, PRN  dextrose 10%, 25 g, PRN  diphenhydrAMINE, 6.25 mg, Q6H PRN  glucagon (human recombinant), 1 mg, PRN  guaiFENesin 100 mg/5 ml, 200 mg, Q4H PRN  insulin aspart U-100, 1-10 Units, Q6H PRN  loperamide, 2 mg, QID PRN  ondansetron, 4 mg, Q8H PRN      AVS Discharge instructions received and reviewed with pt and family at bedside.  Pt voiced understanding and all questions answered to satisfaction.  Stressed importance to making and keeping all follow up appointments.  Medications at bedside and reviewed with pt.  Tele monitor removed and brought to monitor tech.  IV d/c'd with tip intact, pressure dressing applied.  Pt will call when ready to be transported [facility] via w/c to be discharged home.               Lead Monitored: Lead II Rhythm: normal sinus rhythm Frequency/Ectopy: PVCs  Cardiac/Telemetry Box Number: [id number]  VTE Required Core Measure: Pharmacological prophylaxis initiated/maintained " Last Bowel Movement: 04/10/23  Diet Cardiac  Diet diabetic     Chucho Score: 19  Fall Risk Score: 11  Accucheck []   Freq?      Lines/Drains/Airways       None

## 2023-04-10 NOTE — ASSESSMENT & PLAN NOTE
Patient's FSGs are uncontrolled due to hyperglycemia on current medication regimen.  Last A1c reviewed-   Lab Results   Component Value Date    HGBA1C 6.9 (H) 03/06/2023     Most recent fingerstick glucose reviewed-   Recent Labs   Lab 04/09/23  1607 04/10/23  1139   POCTGLUCOSE 174* 217*     Current correctional scale  Low  Maintain anti-hyperglycemic dose as follows-   Antihyperglycemics (From admission, onward)    Start     Stop Route Frequency Ordered    04/09/23 0703  insulin aspart U-100 pen 1-10 Units         -- SubQ Every 6 hours PRN 04/09/23 0603        Hold Oral hypoglycemics while patient is in the hospital.

## 2023-04-12 ENCOUNTER — TELEPHONE (OUTPATIENT)
Dept: GASTROENTEROLOGY | Facility: CLINIC | Age: 68
End: 2023-04-12
Payer: MEDICARE

## 2023-04-12 NOTE — TELEPHONE ENCOUNTER
----- Message from Stefany Babb PA-C sent at 4/12/2023  2:22 PM CDT -----  I will see him at lunch one day at 12:45. You can double book me.  ----- Message -----  From: Annel Martinez RN  Sent: 4/12/2023  10:58 AM CDT  To: Stefany Babb PA-C    I do not see you have anything sooner. Can Samuel see them since it is a hospital follow up?    Well I see Samuel's not and she said with you in 3-4 weeks. He is scheduled with you in 5 weeks.  ----- Message -----  From: Stefany Babb PA-C  Sent: 4/12/2023  10:44 AM CDT  To: [facility] Staff    Can you please get him an in clinic appointment as soon as we can?

## 2023-04-13 ENCOUNTER — LAB VISIT (OUTPATIENT)
Dept: LAB | Facility: HOSPITAL | Age: 68
End: 2023-04-13
Attending: INTERNAL MEDICINE
Payer: MEDICARE

## 2023-04-13 DIAGNOSIS — J98.4 DISEASE OF LUNG: ICD-10-CM

## 2023-04-13 DIAGNOSIS — E11.00 TYPE II DIABETES MELLITUS WITH HYPEROSMOLARITY, UNCONTROLLED: Primary | ICD-10-CM

## 2023-04-13 LAB
ALBUMIN SERPL BCP-MCNC: 3.3 G/DL (ref 3.5–5.2)
ALP SERPL-CCNC: 73 U/L (ref 55–135)
ALT SERPL W/O P-5'-P-CCNC: 19 U/L (ref 10–44)
ANION GAP SERPL CALC-SCNC: 8 MMOL/L (ref 8–16)
AST SERPL-CCNC: 20 U/L (ref 10–40)
BASOPHILS # BLD AUTO: 0.04 K/UL (ref 0–0.2)
BASOPHILS NFR BLD: 0.5 % (ref 0–1.9)
BILIRUB SERPL-MCNC: 0.3 MG/DL (ref 0.1–1)
BUN SERPL-MCNC: 22 MG/DL (ref 8–23)
CALCIUM SERPL-MCNC: 9.2 MG/DL (ref 8.7–10.5)
CHLORIDE SERPL-SCNC: 101 MMOL/L (ref 95–110)
CO2 SERPL-SCNC: 27 MMOL/L (ref 23–29)
CREAT SERPL-MCNC: 0.9 MG/DL (ref 0.5–1.4)
DIFFERENTIAL METHOD: ABNORMAL
EOSINOPHIL # BLD AUTO: 0.3 K/UL (ref 0–0.5)
EOSINOPHIL NFR BLD: 4 % (ref 0–8)
ERYTHROCYTE [DISTWIDTH] IN BLOOD BY AUTOMATED COUNT: 14.8 % (ref 11.5–14.5)
EST. GFR  (NO RACE VARIABLE): >60 ML/MIN/1.73 M^2
GLUCOSE SERPL-MCNC: 233 MG/DL (ref 70–110)
HCT VFR BLD AUTO: 31.8 % (ref 40–54)
HGB BLD-MCNC: 10.8 G/DL (ref 14–18)
IMM GRANULOCYTES # BLD AUTO: 0.03 K/UL (ref 0–0.04)
IMM GRANULOCYTES NFR BLD AUTO: 0.4 % (ref 0–0.5)
LYMPHOCYTES # BLD AUTO: 1.1 K/UL (ref 1–4.8)
LYMPHOCYTES NFR BLD: 13 % (ref 18–48)
MCH RBC QN AUTO: 31.7 PG (ref 27–31)
MCHC RBC AUTO-ENTMCNC: 34 G/DL (ref 32–36)
MCV RBC AUTO: 93 FL (ref 82–98)
MONOCYTES # BLD AUTO: 0.5 K/UL (ref 0.3–1)
MONOCYTES NFR BLD: 6 % (ref 4–15)
NEUTROPHILS # BLD AUTO: 6.2 K/UL (ref 1.8–7.7)
NEUTROPHILS NFR BLD: 76.1 % (ref 38–73)
NRBC BLD-RTO: 0 /100 WBC
PLATELET # BLD AUTO: 313 K/UL (ref 150–450)
PMV BLD AUTO: 10.9 FL (ref 9.2–12.9)
POTASSIUM SERPL-SCNC: 4.8 MMOL/L (ref 3.5–5.1)
PROT SERPL-MCNC: 7 G/DL (ref 6–8.4)
RBC # BLD AUTO: 3.41 M/UL (ref 4.6–6.2)
SODIUM SERPL-SCNC: 136 MMOL/L (ref 136–145)
WBC # BLD AUTO: 8.2 K/UL (ref 3.9–12.7)

## 2023-04-13 PROCEDURE — 85025 COMPLETE CBC W/AUTO DIFF WBC: CPT | Performed by: INTERNAL MEDICINE

## 2023-04-13 PROCEDURE — 80053 COMPREHEN METABOLIC PANEL: CPT | Performed by: INTERNAL MEDICINE

## 2023-04-14 ENCOUNTER — TELEPHONE (OUTPATIENT)
Dept: GASTROENTEROLOGY | Facility: CLINIC | Age: 68
End: 2023-04-14
Payer: MEDICARE

## 2023-04-14 LAB
BACTERIA BLD CULT: NORMAL
BACTERIA BLD CULT: NORMAL

## 2023-04-14 NOTE — TELEPHONE ENCOUNTER
----- Message from Dagmar Banegas sent at 4/14/2023  7:46 AM CDT -----  Type:  Same Day Appointment Request    Caller is requesting a same day appointment.  Caller declined first available appointment listed below.    Name of Caller:Demi Rivas (wife)  When is the first available appointment?4/17  Symptoms:knot in his feeding tube  Best Call Back Number:[phone]  Additional Information: .    Thank you

## 2023-04-14 NOTE — TELEPHONE ENCOUNTER
Returned call to pt's wife, Demi who stated there is a large knot where pt's feeding tube goes into the skin and is getting larger. Demi was advised to take the pt to ED. She agreed and verbalized understanding.

## 2023-04-17 ENCOUNTER — HOSPITAL ENCOUNTER (OUTPATIENT)
Facility: HOSPITAL | Age: 68
Discharge: HOME | End: 2023-04-19
Attending: EMERGENCY MEDICINE | Admitting: INTERNAL MEDICINE
Payer: MEDICARE

## 2023-04-17 ENCOUNTER — OFFICE VISIT (OUTPATIENT)
Dept: GASTROENTEROLOGY | Facility: CLINIC | Age: 68
End: 2023-04-17
Payer: MEDICARE

## 2023-04-17 VITALS
DIASTOLIC BLOOD PRESSURE: 59 MMHG | WEIGHT: 116.63 LBS | HEIGHT: 68 IN | BODY MASS INDEX: 17.68 KG/M2 | HEART RATE: 75 BPM | SYSTOLIC BLOOD PRESSURE: 103 MMHG

## 2023-04-17 DIAGNOSIS — A04.72 C. DIFFICILE COLITIS: Primary | ICD-10-CM

## 2023-04-17 DIAGNOSIS — Z93.1 PEG (PERCUTANEOUS ENDOSCOPIC GASTROSTOMY) STATUS: ICD-10-CM

## 2023-04-17 DIAGNOSIS — T85.848D PAIN AROUND PERCUTANEOUS ENDOSCOPIC GASTROSTOMY (PEG) TUBE SITE, SUBSEQUENT ENCOUNTER: ICD-10-CM

## 2023-04-17 DIAGNOSIS — K94.23 PEG TUBE MALFUNCTION: ICD-10-CM

## 2023-04-17 DIAGNOSIS — R19.7 DIARRHEA, UNSPECIFIED TYPE: Primary | ICD-10-CM

## 2023-04-17 PROBLEM — R63.6 SEVERELY UNDERWEIGHT ADULT: Status: ACTIVE | Noted: 2023-04-17

## 2023-04-17 PROBLEM — R53.1 GENERALIZED WEAKNESS: Status: RESOLVED | Noted: 2023-04-09 | Resolved: 2023-04-17

## 2023-04-17 LAB
ALBUMIN SERPL BCP-MCNC: 3.6 G/DL (ref 3.5–5.2)
ALP SERPL-CCNC: 67 U/L (ref 55–135)
ALT SERPL W/O P-5'-P-CCNC: 21 U/L (ref 10–44)
ANION GAP SERPL CALC-SCNC: 11 MMOL/L (ref 8–16)
AST SERPL-CCNC: 20 U/L (ref 10–40)
BASOPHILS # BLD AUTO: 0.04 K/UL (ref 0–0.2)
BASOPHILS NFR BLD: 0.6 % (ref 0–1.9)
BILIRUB SERPL-MCNC: 0.4 MG/DL (ref 0.1–1)
BILIRUB UR QL STRIP: NEGATIVE
BUN SERPL-MCNC: 28 MG/DL (ref 8–23)
CALCIUM SERPL-MCNC: 9.2 MG/DL (ref 8.7–10.5)
CHLORIDE SERPL-SCNC: 100 MMOL/L (ref 95–110)
CLARITY UR: CLEAR
CO2 SERPL-SCNC: 26 MMOL/L (ref 23–29)
COLOR UR: COLORLESS
CREAT SERPL-MCNC: 0.9 MG/DL (ref 0.5–1.4)
DIFFERENTIAL METHOD: ABNORMAL
EOSINOPHIL # BLD AUTO: 0.3 K/UL (ref 0–0.5)
EOSINOPHIL NFR BLD: 3.8 % (ref 0–8)
ERYTHROCYTE [DISTWIDTH] IN BLOOD BY AUTOMATED COUNT: 14.5 % (ref 11.5–14.5)
EST. GFR  (NO RACE VARIABLE): >60 ML/MIN/1.73 M^2
GLUCOSE SERPL-MCNC: 194 MG/DL (ref 70–110)
GLUCOSE UR QL STRIP: NEGATIVE
HCT VFR BLD AUTO: 31.1 % (ref 40–54)
HGB BLD-MCNC: 10.5 G/DL (ref 14–18)
HGB UR QL STRIP: NEGATIVE
IMM GRANULOCYTES # BLD AUTO: 0.02 K/UL (ref 0–0.04)
IMM GRANULOCYTES NFR BLD AUTO: 0.3 % (ref 0–0.5)
KETONES UR QL STRIP: NEGATIVE
LACTATE SERPL-SCNC: 0.7 MMOL/L (ref 0.5–2.2)
LEUKOCYTE ESTERASE UR QL STRIP: NEGATIVE
LIPASE SERPL-CCNC: 14 U/L (ref 4–60)
LYMPHOCYTES # BLD AUTO: 1.2 K/UL (ref 1–4.8)
LYMPHOCYTES NFR BLD: 17.7 % (ref 18–48)
MCH RBC QN AUTO: 30.9 PG (ref 27–31)
MCHC RBC AUTO-ENTMCNC: 33.8 G/DL (ref 32–36)
MCV RBC AUTO: 92 FL (ref 82–98)
MONOCYTES # BLD AUTO: 0.5 K/UL (ref 0.3–1)
MONOCYTES NFR BLD: 7.7 % (ref 4–15)
NEUTROPHILS # BLD AUTO: 4.8 K/UL (ref 1.8–7.7)
NEUTROPHILS NFR BLD: 69.9 % (ref 38–73)
NITRITE UR QL STRIP: NEGATIVE
NRBC BLD-RTO: 0 /100 WBC
PH UR STRIP: 7 [PH] (ref 5–8)
PLATELET # BLD AUTO: 228 K/UL (ref 150–450)
PMV BLD AUTO: 9.3 FL (ref 9.2–12.9)
POCT GLUCOSE: 164 MG/DL (ref 70–110)
POTASSIUM SERPL-SCNC: 4.7 MMOL/L (ref 3.5–5.1)
PROT SERPL-MCNC: 8.1 G/DL (ref 6–8.4)
PROT UR QL STRIP: NEGATIVE
RBC # BLD AUTO: 3.4 M/UL (ref 4.6–6.2)
SODIUM SERPL-SCNC: 137 MMOL/L (ref 136–145)
SP GR UR STRIP: 1.01 (ref 1–1.03)
URN SPEC COLLECT METH UR: ABNORMAL
UROBILINOGEN UR STRIP-ACNC: NEGATIVE EU/DL
WBC # BLD AUTO: 6.84 K/UL (ref 3.9–12.7)

## 2023-04-17 PROCEDURE — 99214 PR OFFICE/OUTPT VISIT, EST, LEVL IV, 30-39 MIN: ICD-10-PCS | Mod: S$GLB,,, | Performed by: PHYSICIAN ASSISTANT

## 2023-04-17 PROCEDURE — 99999 PR PBB SHADOW E&M-EST. PATIENT-LVL IV: ICD-10-PCS | Mod: PBBFAC,,, | Performed by: PHYSICIAN ASSISTANT

## 2023-04-17 PROCEDURE — 3074F SYST BP LT 130 MM HG: CPT | Mod: CPTII,S$GLB,, | Performed by: PHYSICIAN ASSISTANT

## 2023-04-17 PROCEDURE — 25500020 PHARM REV CODE 255: Performed by: EMERGENCY MEDICINE

## 2023-04-17 PROCEDURE — 85025 COMPLETE CBC W/AUTO DIFF WBC: CPT | Performed by: EMERGENCY MEDICINE

## 2023-04-17 PROCEDURE — 1101F PT FALLS ASSESS-DOCD LE1/YR: CPT | Mod: CPTII,S$GLB,, | Performed by: PHYSICIAN ASSISTANT

## 2023-04-17 PROCEDURE — 3078F PR MOST RECENT DIASTOLIC BLOOD PRESSURE < 80 MM HG: ICD-10-PCS | Mod: CPTII,S$GLB,, | Performed by: PHYSICIAN ASSISTANT

## 2023-04-17 PROCEDURE — 87040 BLOOD CULTURE FOR BACTERIA: CPT | Mod: 59 | Performed by: EMERGENCY MEDICINE

## 2023-04-17 PROCEDURE — 1101F PR PT FALLS ASSESS DOC 0-1 FALLS W/OUT INJ PAST YR: ICD-10-PCS | Mod: CPTII,S$GLB,, | Performed by: PHYSICIAN ASSISTANT

## 2023-04-17 PROCEDURE — 3074F PR MOST RECENT SYSTOLIC BLOOD PRESSURE < 130 MM HG: ICD-10-PCS | Mod: CPTII,S$GLB,, | Performed by: PHYSICIAN ASSISTANT

## 2023-04-17 PROCEDURE — G0378 HOSPITAL OBSERVATION PER HR: HCPCS

## 2023-04-17 PROCEDURE — 3288F PR FALLS RISK ASSESSMENT DOCUMENTED: ICD-10-PCS | Mod: CPTII,S$GLB,, | Performed by: PHYSICIAN ASSISTANT

## 2023-04-17 PROCEDURE — 81003 URINALYSIS AUTO W/O SCOPE: CPT | Performed by: EMERGENCY MEDICINE

## 2023-04-17 PROCEDURE — 3288F FALL RISK ASSESSMENT DOCD: CPT | Mod: CPTII,S$GLB,, | Performed by: PHYSICIAN ASSISTANT

## 2023-04-17 PROCEDURE — 1159F MED LIST DOCD IN RCRD: CPT | Mod: CPTII,S$GLB,, | Performed by: PHYSICIAN ASSISTANT

## 2023-04-17 PROCEDURE — 96376 TX/PRO/DX INJ SAME DRUG ADON: CPT

## 2023-04-17 PROCEDURE — 80053 COMPREHEN METABOLIC PANEL: CPT | Performed by: EMERGENCY MEDICINE

## 2023-04-17 PROCEDURE — 82962 GLUCOSE BLOOD TEST: CPT

## 2023-04-17 PROCEDURE — 63600175 PHARM REV CODE 636 W HCPCS: Performed by: EMERGENCY MEDICINE

## 2023-04-17 PROCEDURE — 1125F AMNT PAIN NOTED PAIN PRSNT: CPT | Mod: CPTII,S$GLB,, | Performed by: PHYSICIAN ASSISTANT

## 2023-04-17 PROCEDURE — 3008F BODY MASS INDEX DOCD: CPT | Mod: CPTII,S$GLB,, | Performed by: PHYSICIAN ASSISTANT

## 2023-04-17 PROCEDURE — 1125F PR PAIN SEVERITY QUANTIFIED, PAIN PRESENT: ICD-10-PCS | Mod: CPTII,S$GLB,, | Performed by: PHYSICIAN ASSISTANT

## 2023-04-17 PROCEDURE — 83690 ASSAY OF LIPASE: CPT | Performed by: EMERGENCY MEDICINE

## 2023-04-17 PROCEDURE — 99285 EMERGENCY DEPT VISIT HI MDM: CPT | Mod: 25

## 2023-04-17 PROCEDURE — 3008F PR BODY MASS INDEX (BMI) DOCUMENTED: ICD-10-PCS | Mod: CPTII,S$GLB,, | Performed by: PHYSICIAN ASSISTANT

## 2023-04-17 PROCEDURE — 96361 HYDRATE IV INFUSION ADD-ON: CPT

## 2023-04-17 PROCEDURE — 25000003 PHARM REV CODE 250: Performed by: INTERNAL MEDICINE

## 2023-04-17 PROCEDURE — 25000003 PHARM REV CODE 250: Performed by: EMERGENCY MEDICINE

## 2023-04-17 PROCEDURE — 99214 OFFICE O/P EST MOD 30 MIN: CPT | Mod: S$GLB,,, | Performed by: PHYSICIAN ASSISTANT

## 2023-04-17 PROCEDURE — 96374 THER/PROPH/DIAG INJ IV PUSH: CPT

## 2023-04-17 PROCEDURE — 99999 PR PBB SHADOW E&M-EST. PATIENT-LVL IV: CPT | Mod: PBBFAC,,, | Performed by: PHYSICIAN ASSISTANT

## 2023-04-17 PROCEDURE — 63600175 PHARM REV CODE 636 W HCPCS: Performed by: INTERNAL MEDICINE

## 2023-04-17 PROCEDURE — 96375 TX/PRO/DX INJ NEW DRUG ADDON: CPT

## 2023-04-17 PROCEDURE — 3078F DIAST BP <80 MM HG: CPT | Mod: CPTII,S$GLB,, | Performed by: PHYSICIAN ASSISTANT

## 2023-04-17 PROCEDURE — 83605 ASSAY OF LACTIC ACID: CPT | Performed by: EMERGENCY MEDICINE

## 2023-04-17 PROCEDURE — 1159F PR MEDICATION LIST DOCUMENTED IN MEDICAL RECORD: ICD-10-PCS | Mod: CPTII,S$GLB,, | Performed by: PHYSICIAN ASSISTANT

## 2023-04-17 RX ORDER — INSULIN ASPART 100 [IU]/ML
0-5 INJECTION, SOLUTION INTRAVENOUS; SUBCUTANEOUS EVERY 6 HOURS PRN
Status: DISCONTINUED | OUTPATIENT
Start: 2023-04-17 | End: 2023-04-19 | Stop reason: HOSPADM

## 2023-04-17 RX ORDER — ACETAMINOPHEN AND CODEINE PHOSPHATE 120; 12 MG/5ML; MG/5ML
5 SOLUTION ORAL EVERY 6 HOURS PRN
Status: ON HOLD | COMMUNITY
Start: 2023-04-14 | End: 2023-04-19 | Stop reason: HOSPADM

## 2023-04-17 RX ORDER — IPRATROPIUM BROMIDE AND ALBUTEROL SULFATE 2.5; .5 MG/3ML; MG/3ML
3 SOLUTION RESPIRATORY (INHALATION) EVERY 4 HOURS PRN
Status: DISCONTINUED | OUTPATIENT
Start: 2023-04-17 | End: 2023-04-19 | Stop reason: HOSPADM

## 2023-04-17 RX ORDER — SODIUM CHLORIDE 9 MG/ML
INJECTION, SOLUTION INTRAVENOUS CONTINUOUS
Status: ACTIVE | OUTPATIENT
Start: 2023-04-17 | End: 2023-04-18

## 2023-04-17 RX ORDER — DIPHENHYDRAMINE HYDROCHLORIDE 50 MG/ML
12.5 INJECTION INTRAMUSCULAR; INTRAVENOUS EVERY 4 HOURS PRN
Status: DISCONTINUED | OUTPATIENT
Start: 2023-04-17 | End: 2023-04-19 | Stop reason: HOSPADM

## 2023-04-17 RX ORDER — GLUCAGON 1 MG
1 KIT INJECTION
Status: DISCONTINUED | OUTPATIENT
Start: 2023-04-17 | End: 2023-04-19 | Stop reason: HOSPADM

## 2023-04-17 RX ORDER — MAG HYDROX/ALUMINUM HYD/SIMETH 200-200-20
30 SUSPENSION, ORAL (FINAL DOSE FORM) ORAL EVERY 6 HOURS PRN
Status: DISCONTINUED | OUTPATIENT
Start: 2023-04-17 | End: 2023-04-19 | Stop reason: HOSPADM

## 2023-04-17 RX ORDER — MORPHINE SULFATE 4 MG/ML
2 INJECTION, SOLUTION INTRAMUSCULAR; INTRAVENOUS EVERY 4 HOURS PRN
Status: DISCONTINUED | OUTPATIENT
Start: 2023-04-17 | End: 2023-04-19 | Stop reason: HOSPADM

## 2023-04-17 RX ORDER — MORPHINE SULFATE 4 MG/ML
4 INJECTION, SOLUTION INTRAMUSCULAR; INTRAVENOUS
Status: COMPLETED | OUTPATIENT
Start: 2023-04-17 | End: 2023-04-17

## 2023-04-17 RX ORDER — ONDANSETRON 2 MG/ML
4 INJECTION INTRAMUSCULAR; INTRAVENOUS
Status: COMPLETED | OUTPATIENT
Start: 2023-04-17 | End: 2023-04-17

## 2023-04-17 RX ORDER — HYDRALAZINE HYDROCHLORIDE 20 MG/ML
10 INJECTION INTRAMUSCULAR; INTRAVENOUS EVERY 8 HOURS PRN
Status: DISCONTINUED | OUTPATIENT
Start: 2023-04-17 | End: 2023-04-19 | Stop reason: HOSPADM

## 2023-04-17 RX ORDER — MIDODRINE HYDROCHLORIDE 5 MG/1
5 TABLET ORAL 2 TIMES DAILY WITH MEALS
COMMUNITY
Start: 2023-04-06

## 2023-04-17 RX ORDER — MORPHINE SULFATE 4 MG/ML
4 INJECTION, SOLUTION INTRAMUSCULAR; INTRAVENOUS EVERY 4 HOURS PRN
Status: DISCONTINUED | OUTPATIENT
Start: 2023-04-17 | End: 2023-04-19 | Stop reason: HOSPADM

## 2023-04-17 RX ORDER — ONDANSETRON 2 MG/ML
4 INJECTION INTRAMUSCULAR; INTRAVENOUS EVERY 8 HOURS PRN
Status: DISCONTINUED | OUTPATIENT
Start: 2023-04-17 | End: 2023-04-19 | Stop reason: HOSPADM

## 2023-04-17 RX ADMIN — IOHEXOL 100 ML: 350 INJECTION, SOLUTION INTRAVENOUS at 05:04

## 2023-04-17 RX ADMIN — MORPHINE SULFATE 4 MG: 4 INJECTION INTRAVENOUS at 04:04

## 2023-04-17 RX ADMIN — ONDANSETRON 4 MG: 2 INJECTION INTRAMUSCULAR; INTRAVENOUS at 04:04

## 2023-04-17 RX ADMIN — SODIUM CHLORIDE: 9 INJECTION, SOLUTION INTRAVENOUS at 10:04

## 2023-04-17 RX ADMIN — DIPHENHYDRAMINE HYDROCHLORIDE 12.5 MG: 50 INJECTION, SOLUTION INTRAMUSCULAR; INTRAVENOUS at 10:04

## 2023-04-17 RX ADMIN — SODIUM CHLORIDE 1000 ML: 9 INJECTION, SOLUTION INTRAVENOUS at 04:04

## 2023-04-17 RX ADMIN — MORPHINE SULFATE 4 MG: 4 INJECTION INTRAVENOUS at 10:04

## 2023-04-17 NOTE — PROGRESS NOTES
"Subjective:      Patient ID: Pacheco Rivas is a 67 y.o. male.    Chief Complaint: Other (Pain at feeding tube site)    HPI:  Patient with history of PEG tube reports to clinic today for follow up of Cdiff diarrhea. Patient initially seen by [facility] for FMT due to recurrent C diff. Diarrhea resolved for a few days. Following FMT, he reported to an outside ED for SOB - cavitary lesion of the lung found - was placed on more abx. Reported to [facility] thereafter with recurrent diarrhea. Went to ED x 2 - C diff negative. Due to lung lesion, patient remains on abx: Omnicef, Vancomycin and Flagyl. He is here today for follow up. Reports his diarrhea is significantly improved, however, he is complaining of pain around his PEG. Reports pain began last week. Now there is a "lump" that is becoming larger and larger. Home health nurse evaluated this days ago and instructed patient to go to the ED but he did not. Denies any fevers, but the lump is becoming increasingly tender.  Located surrounding the ostomy just under left ribs. Reports he is unable to get his food into the tube. CT from 4/8/23 reports "gastrostomy balloon may be malpositions slightly anterior in relation to the gastric lumen." Patient states growth is become larger and more tender.     Review of Systems   Constitutional:  Negative for activity change and fever.   HENT:  Positive for trouble swallowing.    Cardiovascular:  Negative for chest pain.   Gastrointestinal:  Positive for abdominal pain and diarrhea (much improved). Negative for abdominal distention, blood in stool, nausea and vomiting.   Neurological:  Positive for weakness. Negative for dizziness.   Psychiatric/Behavioral:  The patient is nervous/anxious.      Medical History: Reviewed    Social History: Reviewed    Allergies: Reviewed    Objective:     Physical Exam  Constitutional:       General: He is not in acute distress.     Appearance: He is underweight. He is ill-appearing. He is " "not toxic-appearing or diaphoretic.   HENT:      Head: Normocephalic and atraumatic.   Eyes:      General: No scleral icterus.     Extraocular Movements: Extraocular movements intact.   Abdominal:          Comments: Peg in place. Mass located superior to the ostomy. Mild erythema, no real induration, however very TTP. No current drainage, however patient reports home health nurse has let him know that it is draining what appears to be "pus".   Neurological:      Mental Status: He is alert.       Assessment:     1. Diarrhea, unspecified type    2. PEG (percutaneous endoscopic gastrostomy) status    3. Pain around percutaneous endoscopic gastrostomy (PEG) tube site, subsequent encounter        Plan:     -DDx: misplaced PEG vs infection.   -ED for further evaluation. Discussed with Dr. Hector, Dr. Meyer.   -Called and gave report to the ED.  -Diarrhea improving. Continues on abx for cavitary lung lesion.      [place] was seen today for other.    Diagnoses and all orders for this visit:    Diarrhea, unspecified type    PEG (percutaneous endoscopic gastrostomy) status    Pain around percutaneous endoscopic gastrostomy (PEG) tube site, subsequent encounter        No follow-ups on file.    Thank you for the opportunity to participate in the care of this patient.   Stefany Babb PA-C.      "

## 2023-04-17 NOTE — Clinical Note
Diagnosis: PEG tube malfunction [[id number]]   Future Attending Provider: BAHMAN MATOS [[id number]]   Admitting Provider:: BAHMAN MATOS [[id number]]
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
## 2023-04-18 LAB
ALBUMIN SERPL BCP-MCNC: 2.7 G/DL (ref 3.5–5.2)
ALP SERPL-CCNC: 53 U/L (ref 55–135)
ALT SERPL W/O P-5'-P-CCNC: 15 U/L (ref 10–44)
ANION GAP SERPL CALC-SCNC: 6 MMOL/L (ref 8–16)
AST SERPL-CCNC: 17 U/L (ref 10–40)
BASOPHILS # BLD AUTO: 0.03 K/UL (ref 0–0.2)
BASOPHILS NFR BLD: 0.7 % (ref 0–1.9)
BILIRUB SERPL-MCNC: 0.4 MG/DL (ref 0.1–1)
BUN SERPL-MCNC: 19 MG/DL (ref 8–23)
CALCIUM SERPL-MCNC: 8.3 MG/DL (ref 8.7–10.5)
CHLORIDE SERPL-SCNC: 108 MMOL/L (ref 95–110)
CO2 SERPL-SCNC: 25 MMOL/L (ref 23–29)
CREAT SERPL-MCNC: 0.8 MG/DL (ref 0.5–1.4)
DIFFERENTIAL METHOD: ABNORMAL
EOSINOPHIL # BLD AUTO: 0.3 K/UL (ref 0–0.5)
EOSINOPHIL NFR BLD: 7 % (ref 0–8)
ERYTHROCYTE [DISTWIDTH] IN BLOOD BY AUTOMATED COUNT: 14.6 % (ref 11.5–14.5)
EST. GFR  (NO RACE VARIABLE): >60 ML/MIN/1.73 M^2
GLUCOSE SERPL-MCNC: 141 MG/DL (ref 70–110)
HCT VFR BLD AUTO: 27.2 % (ref 40–54)
HGB BLD-MCNC: 8.9 G/DL (ref 14–18)
IMM GRANULOCYTES # BLD AUTO: 0.01 K/UL (ref 0–0.04)
IMM GRANULOCYTES NFR BLD AUTO: 0.2 % (ref 0–0.5)
INR PPP: 1.1 (ref 0.8–1.2)
LYMPHOCYTES # BLD AUTO: 1.2 K/UL (ref 1–4.8)
LYMPHOCYTES NFR BLD: 25.4 % (ref 18–48)
MAGNESIUM SERPL-MCNC: 1.8 MG/DL (ref 1.6–2.6)
MCH RBC QN AUTO: 31 PG (ref 27–31)
MCHC RBC AUTO-ENTMCNC: 32.7 G/DL (ref 32–36)
MCV RBC AUTO: 95 FL (ref 82–98)
MONOCYTES # BLD AUTO: 0.5 K/UL (ref 0.3–1)
MONOCYTES NFR BLD: 11.6 % (ref 4–15)
NEUTROPHILS # BLD AUTO: 2.5 K/UL (ref 1.8–7.7)
NEUTROPHILS NFR BLD: 55.1 % (ref 38–73)
NRBC BLD-RTO: 0 /100 WBC
PLATELET # BLD AUTO: 174 K/UL (ref 150–450)
PMV BLD AUTO: 9.3 FL (ref 9.2–12.9)
POCT GLUCOSE: 128 MG/DL (ref 70–110)
POCT GLUCOSE: 132 MG/DL (ref 70–110)
POCT GLUCOSE: 146 MG/DL (ref 70–110)
POCT GLUCOSE: 244 MG/DL (ref 70–110)
POTASSIUM SERPL-SCNC: 4 MMOL/L (ref 3.5–5.1)
PROT SERPL-MCNC: 6.2 G/DL (ref 6–8.4)
PROTHROMBIN TIME: 11.7 SEC (ref 9–12.5)
RBC # BLD AUTO: 2.87 M/UL (ref 4.6–6.2)
SODIUM SERPL-SCNC: 139 MMOL/L (ref 136–145)
WBC # BLD AUTO: 4.56 K/UL (ref 3.9–12.7)

## 2023-04-18 PROCEDURE — 96376 TX/PRO/DX INJ SAME DRUG ADON: CPT

## 2023-04-18 PROCEDURE — 82962 GLUCOSE BLOOD TEST: CPT

## 2023-04-18 PROCEDURE — 96372 THER/PROPH/DIAG INJ SC/IM: CPT | Performed by: INTERNAL MEDICINE

## 2023-04-18 PROCEDURE — 63600175 PHARM REV CODE 636 W HCPCS: Performed by: RADIOLOGY

## 2023-04-18 PROCEDURE — 85610 PROTHROMBIN TIME: CPT | Performed by: RADIOLOGY

## 2023-04-18 PROCEDURE — 96361 HYDRATE IV INFUSION ADD-ON: CPT

## 2023-04-18 PROCEDURE — G0378 HOSPITAL OBSERVATION PER HR: HCPCS

## 2023-04-18 PROCEDURE — 96361 HYDRATE IV INFUSION ADD-ON: CPT | Mod: 59

## 2023-04-18 PROCEDURE — 85025 COMPLETE CBC W/AUTO DIFF WBC: CPT | Performed by: INTERNAL MEDICINE

## 2023-04-18 PROCEDURE — 25000003 PHARM REV CODE 250: Performed by: INTERNAL MEDICINE

## 2023-04-18 PROCEDURE — 96376 TX/PRO/DX INJ SAME DRUG ADON: CPT | Mod: 59

## 2023-04-18 PROCEDURE — 36415 COLL VENOUS BLD VENIPUNCTURE: CPT | Performed by: RADIOLOGY

## 2023-04-18 PROCEDURE — 63600175 PHARM REV CODE 636 W HCPCS: Performed by: INTERNAL MEDICINE

## 2023-04-18 PROCEDURE — 36415 COLL VENOUS BLD VENIPUNCTURE: CPT | Performed by: INTERNAL MEDICINE

## 2023-04-18 PROCEDURE — 80053 COMPREHEN METABOLIC PANEL: CPT | Performed by: INTERNAL MEDICINE

## 2023-04-18 PROCEDURE — 83735 ASSAY OF MAGNESIUM: CPT | Performed by: INTERNAL MEDICINE

## 2023-04-18 RX ORDER — FENTANYL CITRATE 50 UG/ML
INJECTION, SOLUTION INTRAMUSCULAR; INTRAVENOUS CODE/TRAUMA/SEDATION MEDICATION
Status: COMPLETED | OUTPATIENT
Start: 2023-04-18 | End: 2023-04-18

## 2023-04-18 RX ORDER — MIDAZOLAM HYDROCHLORIDE 1 MG/ML
INJECTION INTRAMUSCULAR; INTRAVENOUS CODE/TRAUMA/SEDATION MEDICATION
Status: COMPLETED | OUTPATIENT
Start: 2023-04-18 | End: 2023-04-18

## 2023-04-18 RX ADMIN — SODIUM CHLORIDE: 9 INJECTION, SOLUTION INTRAVENOUS at 01:04

## 2023-04-18 RX ADMIN — MORPHINE SULFATE 4 MG: 4 INJECTION INTRAVENOUS at 04:04

## 2023-04-18 RX ADMIN — MORPHINE SULFATE 4 MG: 4 INJECTION INTRAVENOUS at 09:04

## 2023-04-18 RX ADMIN — MORPHINE SULFATE 2 MG: 4 INJECTION INTRAVENOUS at 10:04

## 2023-04-18 RX ADMIN — MIDAZOLAM HYDROCHLORIDE 1 MG: 1 INJECTION, SOLUTION INTRAMUSCULAR; INTRAVENOUS at 03:04

## 2023-04-18 RX ADMIN — DIPHENHYDRAMINE HYDROCHLORIDE 12.5 MG: 50 INJECTION, SOLUTION INTRAMUSCULAR; INTRAVENOUS at 10:04

## 2023-04-18 RX ADMIN — INSULIN ASPART 2 UNITS: 100 INJECTION, SOLUTION INTRAVENOUS; SUBCUTANEOUS at 11:04

## 2023-04-18 RX ADMIN — FENTANYL CITRATE 25 MCG: 0.05 INJECTION, SOLUTION INTRAMUSCULAR; INTRAVENOUS at 03:04

## 2023-04-18 NOTE — PROGRESS NOTES
[facility] Medicine  Progress Note    Patient Name: Pacheco Rivas  MRN: [id number]  Patient Class: OP- Observation   Admission Date: 4/17/2023  Length of Stay: 0 days  Attending Physician: Juan Justin MD  Primary Care Provider: Amarjit Dominguez MD        Subjective:     Principal Problem:PEG tube malfunction        HPI:  Mr. 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 Meyer, known history of right clavicle lung lesion, followed by Pulmonary as outpatient, presented to the ED complaining of abdominal pain around the PEG tube site.  Patient reports 1-2 lose nonbloody bowel movements, but significantly improved compared to previous diarrhea related to C diff.  Patient is still  on oral Flagyl, vancomycin until 4/20.  CT abdomen done in the ED reveals diffuse fluid-filled colon possibly related diuretic illness.  Interval retraction of the peg tube balloon with soft tissue tract.  ED physician discussed case with IR, recommended placing in observation for PEG tube removal/replacement.      Overview/Hospital Course:  No notes on file    Interval History: PEG tube malpositioned. Discussed IR to replace today. Contineue NPO. Patient's wife has tube feeds once able to use PEG    Review of Systems  Objective:     Vital Signs (Most Recent):  Temp: 97.9 °F (36.6 °C) (04/18/23 0838)  Pulse: 63 (04/18/23 0838)  Resp: 18 (04/18/23 0959)  BP: (!) 116/55 (04/18/23 0838)  SpO2: 100 % (04/18/23 0838)   Vital Signs (24h Range):  Temp:  [97.6 °F (36.4 °C)-98.3 °F (36.8 °C)] 97.9 °F (36.6 °C)  Pulse:  [55-79] 63  Resp:  [12-20] 18  SpO2:  [71 %-100 %] 100 %  BP: (102-126)/(52-67) 116/55     Weight: 52.6 kg (116 lb)  Body mass index is 17.64 kg/m².    Intake/Output Summary (Last 24 hours) at 4/18/2023 1103  Last data filed at 4/17/2023 1751  Gross per 24 hour   Intake 1000 ml   Output --   Net 1000 ml      Physical  Exam  HENT:      Head: Normocephalic and atraumatic.   Cardiovascular:      Rate and Rhythm: Normal rate and regular rhythm.      Heart sounds: No murmur heard.  Pulmonary:      Effort: Pulmonary effort is normal. No respiratory distress.      Breath sounds: Normal breath sounds. No wheezing.   Abdominal:      General: Bowel sounds are normal. There is no distension.      Palpations: Abdomen is soft.      Tenderness: There is no abdominal tenderness.      Comments: Swelling above PEG insertion site, mild erythema tender to palpation   Musculoskeletal:         General: No swelling.   Skin:     General: Skin is warm and dry.   Neurological:      Mental Status: He is alert and oriented to person, place, and time. Mental status is at baseline.       Significant Labs: All pertinent labs within the past 24 hours have been reviewed.  CBC:   Recent Labs   Lab 04/17/23  1635 04/18/23  0723   WBC 6.84 4.56   HGB 10.5* 8.9*   HCT 31.1* 27.2*    174     CMP:   Recent Labs   Lab 04/17/23  1635 04/18/23  0723    139   K 4.7 4.0    108   CO2 26 25   * 141*   BUN 28* 19   CREATININE 0.9 0.8   CALCIUM 9.2 8.3*   PROT 8.1 6.2   ALBUMIN 3.6 2.7*   BILITOT 0.4 0.4   ALKPHOS 67 53*   AST 20 17   ALT 21 15   ANIONGAP 11 6*       Significant Imaging: I have reviewed all pertinent imaging results/findings within the past 24 hours.      Assessment/Plan:      * PEG tube malfunction  -IR consulted, aware, plan for replacement today  -NPO  -IV fluids      Severely underweight adult  -BMI 17  -resume tube feeds as soon as able    Type 2 diabetes mellitus with hyperglycemia, without long-term current use of insulin  Patient's FSGs are uncontrolled due to hyperglycemia on current medication regimen.  Last A1c reviewed-   Lab Results   Component Value Date    HGBA1C 6.9 (H) 03/06/2023     Most recent fingerstick glucose reviewed-   Recent Labs   Lab 04/17/23  2214 04/18/23  0233 04/18/23  0602   POCTGLUCOSE 164* 128* 146*      Current correctional scale  Low  Maintain anti-hyperglycemic dose as follows-   Antihyperglycemics (From admission, onward)    Start     Stop Route Frequency Ordered    04/17/23 2013  insulin aspart U-100 pen 0-5 Units         -- SubQ Every 6 hours PRN 04/17/23 1913        Hold Oral hypoglycemics while patient is in the hospital.    Cavitating mass of lung  -known history of cavitary lung lesion.    -followed by Pulmonary as outpatient.      Clostridium difficile infection  -history of C. 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 Meyer  -patient on oral metronidazole, vancomycin as outpatient until 4/20.  -continue above antibiotics.        VTE Risk Mitigation (From admission, onward)         Ordered     Place sequential compression device  Until discontinued         04/17/23 1909                Discharge Planning   BUBBA:      Code Status: Prior   Is the patient medically ready for discharge?:     Reason for patient still in hospital (select all that apply): Patient trending condition, Treatment and Consult recommendations                     Juan Justin MD  Department of Hospital Medicine   [facility] - Telemetry ([facility])

## 2023-04-18 NOTE — SEDATION DOCUMENTATION
Rec'd to procedure room via WC and pt was assisted to table without difficulty.  Consent obtained per PA.  Setting up process beginning

## 2023-04-18 NOTE — PHARMACY MED REC
"Admission Medication History     The home medication history was taken by Blayne Salomon.    You may go to "Admission" then "Reconcile Home Medications" tabs to review and/or act upon these items.     The home medication list has been updated by the Pharmacy department.   Please read ALL comments highlighted in yellow.   Please address this information as you see fit.    Feel free to contact us if you have any questions or require assistance.      Medications listed below were obtained from: Patient/family and Analytic software- [employer]  (Not in a hospital admission)      Blayne Salomon  [id number]    Current Outpatient Medications on File Prior to Encounter   Medication Sig Dispense Refill Last Dose    atorvastatin (LIPITOR) 40 MG tablet Take 40 mg by mouth every evening.   4/16/2023    cefdinir (OMNICEF) 300 MG capsule 1 capsule (300 mg total) by Per G Tube route 2 (two) times daily. for 10 days 20 capsule 0 4/17/2023    cholestyramine (QUESTRAN) 4 gram packet Take 4 g by mouth 2 (two) times daily.   4/16/2023    clopidogrel (PLAVIX) 75 mg tablet Take 75 mg by mouth once daily.   4/17/2023    ergocalciferol (DRISDOL) 200 mcg/mL (8,000 unit/mL) Drop Take 8,000 Units by mouth once daily.   4/16/2023    fluticasone-umeclidin-vilanter (TRELEGY ELLIPTA) 100-62.5-25 mcg DsDv Take 1 puff by mouth once daily.   4/17/2023    gabapentin (NEURONTIN) 250 mg/5 mL solution Take 5 mLs by mouth 2 (two) times a day.   4/17/2023    insulin glargine-lixisenatide (SOLIQUA 100/33) 100 unit-33 mcg/mL InPn pen Inject 50 Units into the skin every morning.   4/17/2023    levothyroxine (SYNTHROID) 25 MCG tablet Take 25 mcg by mouth every morning.   4/17/2023    metFORMIN (GLUCOPHAGE-XR) 500 MG ER 24hr tablet Take 500 mg by mouth every evening.   4/17/2023    metroNIDAZOLE (FLAGYL) 500 MG tablet 1 tablet (500 mg total) by Per G Tube route 3 (three) times daily. for 10 days 30 tablet 0 4/17/2023    midodrine (PROAMATINE) 5 MG Tab Take " 5 mg by mouth 2 (two) times daily with meals.   4/17/2023    ondansetron (ZOFRAN-ODT) 4 MG TbDL Take 1 tablet (4 mg total) by mouth every 6 (six) hours as needed (prn). 6 tablet 0 4/16/2023    ranolazine (RANEXA) 500 MG Tb12 Take 1 tablet (500 mg total) by mouth 2 (two) times daily. 60 tablet 11 4/17/2023    rOPINIRole (REQUIP) 1 MG tablet Take 1 tablet by mouth 3 (three) times daily.   4/17/2023    sucralfate (CARAFATE) 100 mg/mL suspension 10 mLs by Per G Tube route 4 (four) times daily.   4/17/2023    tamsulosin (FLOMAX) 0.4 mg Cap Take 0.4 mg by mouth once daily.   4/17/2023    acetaminophen with codeine (ACETAMINOPHEN-CODEINE) 120mg 12mg 5mL Soln Take 5 mLs by mouth every 6 (six) hours as needed.   4/17/2023    aspirin 81 MG Chew Take 81 mg by mouth once daily.   Not taking    carvedilol (COREG) 12.5 MG tablet Take 12.5 mg by mouth 2 (two) times daily.    Not taking    meclizine (ANTIVERT) 25 mg tablet Take 1 tablet by mouth 3 (three) times daily as needed.       vancomycin 50 mg/mL SolR 2.5 mLs (125 mg total) by PEG Tube route 4 (four) times daily. for 10 days 150 mL 0                            .

## 2023-04-18 NOTE — CONSULTS
[facility] - Telemetry ([facility])  Adult Nutrition  Consult Note    SUMMARY     Recommendations  1. Recommend pt be initiated onto EN when medically appropriate.   - Diabetisource AC. Initiate at 10ml/hr, advance as pt tolerates. Aim for goal rate of 55ml/hr.   - Formula provides: 1584kcals, 79.2g Protein, 1077.12ml Free water.   -85ml fwf Q4hrs (510ml) or per MD/NP.   If Bolus feeds are preferred,   - Bolus 1 carton 5 time/day. Formula/day will provide 1500kcals, 75g Protein, 1020ml Free water.   - Flush 55ml of fwf before and after each bolus feed.   - Check Mg, Na, K+, Phos, and Glu before and during initiation, correct as indicated.   2. Weigh daily.    Goals:   1. Pt will be initiated onto EN within 24-48hrs.   2. Pt will tolerate >50% of energy need by RD follow up.  Nutrition Goal Status: new  Communication of RD Recs: other (comment) (POC: Sticky Note)    Assessment and Plan    Nutrition Problem  Inadequate PO intake  Underweight    Related to (etiology):   Decrease ability to consume sufficient nutrition  Inadequate energy intake    Signs and Symptoms (as evidenced by):   NPO  BMI 17.63    Interventions(treatment strategy):  1. Initiation onto EN support therapy   2. Collaboration with other providers    Nutrition Diagnosis Status:   New       Malnutrition Assessment                                       Reason for Assessment    Reason For Assessment: consult  Diagnosis: other (see comments) (PEG tube malfunction)  Relevant Medical History: CAD, CHF, HLD, T2DM  Interdisciplinary Rounds: did not attend  General Information Comments: 89 y.o male admitted w/ current principal problem of PEG tube malfunction. Per H&P, pt presented to the ED complaining of abdominal pain around the PEG tube site.  Patient reports 1-2 lose non bloody bowel movements, but significantly improved compared to previous diarrhea related to C diff.  Patient is still  on oral Flagyl, vancomycin until 4/20.  CT abdomen done in the ED  "reveals diffuse fluid-filled colon possibly related diuretic illness.  Interval retraction of the peg tube balloon with soft tissue tract.  ED physician discussed case with IR, recommended placing in observation for PEG tube removal/replacement. NFPE not performed per Isolation status. Per chart review the pt has loss 5lbs within x8 days, 4% wt change. The pt is currently charted to weigh 116lb, BMI 17.64 (Underweight). The pt LBM was 4/17, no stool consistency charted. Chucho score 21, no risk. No wounds charted. All pertinent labs and medications reviewed. Dietitian will continue to monitor.  Nutrition Discharge Planning: Diabetic TF formula    Wt Readings from Last 20 Encounters:   04/18/23 52.6 kg (115 lb 15.4 oz)   04/17/23 52.9 kg (116 lb 10 oz)   04/10/23 54.9 kg (121 lb 0.5 oz)   04/04/23 52.1 kg (114 lb 13.8 oz)   04/04/23 52.3 kg (115 lb 4.8 oz)   03/25/23 52.6 kg (116 lb)   03/23/23 52.6 kg (116 lb)   03/04/23 57.6 kg (127 lb)   01/29/23 57.6 kg (127 lb)   11/29/18 90.2 kg (198 lb 13.7 oz)   07/12/16 90.2 kg (198 lb 13.7 oz)   01/05/16 92.8 kg (204 lb 9.4 oz)   10/13/15 94.1 kg (207 lb 7.3 oz)   10/02/15 91.3 kg (201 lb 3.2 oz)   08/26/15 90.9 kg (200 lb 8 oz)   08/24/15 92.8 kg (204 lb 8 oz)   07/21/15 92.1 kg (203 lb)   07/17/15 95.7 kg (210 lb 15.7 oz)   07/09/15 90.7 kg (200 lb)   07/01/15 94.7 kg (208 lb 11.2 oz)   ]    Nutrition Risk Screen    Nutrition Risk Screen: tube feeding or parenteral nutrition    Nutrition/Diet History    Spiritual, Cultural Beliefs, Confucianism Practices, Values that Affect Care: no  Food Allergies: NKFA  Factors Affecting Nutritional Intake: abdominal pain, NPO    Anthropometrics    Temp: 98.1 °F (36.7 °C)  Height: 5' 8" (172.7 cm)  Height (inches): 68 in  Weight Method: Bed Scale  Weight: 52.6 kg (115 lb 15.4 oz)  Weight (lb): 115.96 lb  Ideal Body Weight (IBW), Male: 154 lb  % Ideal Body Weight, Male (lb): 75.3 %  BMI (Calculated): 17.6  BMI Grade: 17 - 18.4 " protein-energy malnutrition grade I       Lab/Procedures/Meds  BMP  Lab Results   Component Value Date     04/18/2023    K 4.0 04/18/2023     04/18/2023    CO2 25 04/18/2023    BUN 19 04/18/2023    CREATININE 0.8 04/18/2023    CALCIUM 8.3 (L) 04/18/2023    ANIONGAP 6 (L) 04/18/2023    EGFRNORACEVR >60 04/18/2023       Pertinent Labs Reviewed: reviewed  Pertinent Medications Reviewed: reviewed  Scheduled Meds:  Continuous Infusions:   sodium chloride 0.9% 75 mL/hr at 04/18/23 1320     PRN Meds:.albuterol-ipratropium, aluminum-magnesium hydroxide-simethicone, dextrose 10%, dextrose 10%, diphenhydrAMINE, glucagon (human recombinant), hydrALAZINE, insulin aspart U-100, morphine, morphine, ondansetron    Physical Findings/Assessment         Estimated/Assessed Needs    Weight Used For Calorie Calculations: 52.6 kg (115 lb 15.4 oz)  Energy Calorie Requirements (kcal): 0144-6156 (30-35kcal/kg)  Energy Need Method: Kcal/kg  Protein Requirements: 63-79 (1.2-1.5g/kg per Underweight)  Weight Used For Protein Calculations: 52.6 kg (115 lb 15.4 oz)  Fluid Requirements (mL): 0735-5251  Estimated Fluid Requirement Method: RDA Method  RDA Method (mL): 1578  CHO Requirement: 197-230      Nutrition Prescription Ordered    Current Diet Order: NPO    Evaluation of Received Nutrient/Fluid Intake    % Kcal Needs: 0  % Protein Needs: 0  I/O: +1000 Since admit  Energy Calories Required: not meeting needs  Protein Required: not meeting needs  Fluid Required: not meeting needs  Tolerance: not tolerating  % Intake of Estimated Energy Needs: 0 - 25 %  % Meal Intake: NPO    Nutrition Risk    Level of Risk/Frequency of Follow-up: high (x2 weekly)       Monitor and Evaluation    Food and Nutrient Intake: energy intake, enteral nutrition intake  Food and Nutrient Adminstration: enteral and parenteral nutrition administration  Knowledge/Beliefs/Attitudes: food and nutrition knowledge/skill, beliefs and attitudes  Physical Activity and  Function: factors affecting access to physical activity, nutrition-related ADLs and IADLs  Anthropometric Measurements: weight, weight change, body mass index  Biochemical Data, Medical Tests and Procedures: electrolyte and renal panel, gastrointestinal profile, glucose/endocrine profile, inflammatory profile, lipid profile  Nutrition-Focused Physical Findings: overall appearance, skin       Nutrition Follow-Up    RD Follow-up?: Yes  Tino Orlando, Registration Eligible, Provisional LDN

## 2023-04-18 NOTE — PLAN OF CARE
POC reviewed with pt. Pt verbalizes understanding of POC. No questions at this time.  AAOx4. NADN.  No tele monitor ordered.   Pt remains free of falls. Independent with ADLs.  No complaints at this time.  Safety measures in place. Will continue to monitor.  Informed pt to call for assistance before getting up. Pt verbalizes understanding.   Hourly rounding and chart check complete.

## 2023-04-18 NOTE — CONSULTS
Chart reviewed by Dr. Londono.       ASSESSMENT/PLAN:    G tube malfunction    The order for a G tube replacement has been placed and the procedure will be performed asap.          Thank you for the consult.

## 2023-04-18 NOTE — ASSESSMENT & PLAN NOTE
-history of C. 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 Meyer  -patient on oral metronidazole, vancomycin as outpatient until 4/20.  -states that his diarrhea is significantly better (currently having only 1-2 lose BM per day).
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
## 2023-04-18 NOTE — PLAN OF CARE
Nutrition Recs: 4/18  1. Recommend pt be initiated onto EN when medically appropriate.   - Diabetisource AC. Initiate at 10ml/hr, advance as pt tolerates. Aim for goal rate of 55ml/hr.   - Formula provides: 1584kcals, 79.2g Protein, 1077.12ml Free water.   -85ml fwf Q4hrs (510ml) or per MD/NP.   If Bolus feeds are preferred,   - Bolus 1 carton 5 time/day. Formula/day will provide 1500kcals, 75g Protein, 1020ml Free water.   - Flush 55ml of fwf before and after each bolus feed.   - Check Mg, Na, K+, Phos, and Glu before and during initiation, correct as indicated.   2. Weigh daily.    Goals:   1. Pt will be initiated onto EN within 24-48hrs.   2. Pt will tolerate >50% of energy need by RD follow up.  Nutrition Goal Status: new  Communication of RD Recs: other (comment) (POC: Sticky Note)  Tino Orlando, Registration Eligible, Provisional LDN

## 2023-04-18 NOTE — ASSESSMENT & PLAN NOTE
Patient's FSGs are uncontrolled due to hyperglycemia on current medication regimen.  Last A1c reviewed-   Lab Results   Component Value Date    HGBA1C 6.9 (H) 03/06/2023     Most recent fingerstick glucose reviewed-   Recent Labs   Lab 04/17/23  2214 04/18/23  0233 04/18/23  0602   POCTGLUCOSE 164* 128* 146*     Current correctional scale  Low  Maintain anti-hyperglycemic dose as follows-   Antihyperglycemics (From admission, onward)    Start     Stop Route Frequency Ordered    04/17/23 2013  insulin aspart U-100 pen 0-5 Units         -- SubQ Every 6 hours PRN 04/17/23 1913        Hold Oral hypoglycemics while patient is in the hospital.
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
## 2023-04-18 NOTE — ASSESSMENT & PLAN NOTE
-history of C. 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 Meyer  -patient on oral metronidazole, vancomycin as outpatient until 4/20.  -continue above antibiotics.

## 2023-04-18 NOTE — INTERVAL H&P NOTE
The patient has been examined and the H&P has been reviewed:    I concur with the findings and no changes have occurred since H&P was written.        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

## 2023-04-18 NOTE — ASSESSMENT & PLAN NOTE
Patient's FSGs are uncontrolled due to hyperglycemia on current medication regimen.  Last A1c reviewed-   Lab Results   Component Value Date    HGBA1C 6.9 (H) 03/06/2023     Most recent fingerstick glucose reviewed- No results for input(s): POCTGLUCOSE in the last 24 hours.  Current correctional scale  Low  Maintain anti-hyperglycemic dose as follows-   Antihyperglycemics (From admission, onward)    Start     Stop Route Frequency Ordered    04/17/23 2013  insulin aspart U-100 pen 0-5 Units         -- SubQ Every 6 hours PRN 04/17/23 1913        Hold Oral hypoglycemics while patient is in the hospital.

## 2023-04-18 NOTE — SUBJECTIVE & OBJECTIVE
Interval History: PEG tube malpositioned. Discussed IR to replace today. Contineue NPO. Patient's wife has tube feeds once able to use PEG    Review of Systems  Objective:     Vital Signs (Most Recent):  Temp: 97.9 °F (36.6 °C) (04/18/23 0838)  Pulse: 63 (04/18/23 0838)  Resp: 18 (04/18/23 0959)  BP: (!) 116/55 (04/18/23 0838)  SpO2: 100 % (04/18/23 0838)   Vital Signs (24h Range):  Temp:  [97.6 °F (36.4 °C)-98.3 °F (36.8 °C)] 97.9 °F (36.6 °C)  Pulse:  [55-79] 63  Resp:  [12-20] 18  SpO2:  [71 %-100 %] 100 %  BP: (102-126)/(52-67) 116/55     Weight: 52.6 kg (116 lb)  Body mass index is 17.64 kg/m².    Intake/Output Summary (Last 24 hours) at 4/18/2023 1103  Last data filed at 4/17/2023 1751  Gross per 24 hour   Intake 1000 ml   Output --   Net 1000 ml      Physical Exam  HENT:      Head: Normocephalic and atraumatic.   Cardiovascular:      Rate and Rhythm: Normal rate and regular rhythm.      Heart sounds: No murmur heard.  Pulmonary:      Effort: Pulmonary effort is normal. No respiratory distress.      Breath sounds: Normal breath sounds. No wheezing.   Abdominal:      General: Bowel sounds are normal. There is no distension.      Palpations: Abdomen is soft.      Tenderness: There is no abdominal tenderness.      Comments: Swelling above PEG insertion site, mild erythema tender to palpation   Musculoskeletal:         General: No swelling.   Skin:     General: Skin is warm and dry.   Neurological:      Mental Status: He is alert and oriented to person, place, and time. Mental status is at baseline.       Significant Labs: All pertinent labs within the past 24 hours have been reviewed.  CBC:   Recent Labs   Lab 04/17/23  1635 04/18/23  0723   WBC 6.84 4.56   HGB 10.5* 8.9*   HCT 31.1* 27.2*    174     CMP:   Recent Labs   Lab 04/17/23  1635 04/18/23  0723    139   K 4.7 4.0    108   CO2 26 25   * 141*   BUN 28* 19   CREATININE 0.9 0.8   CALCIUM 9.2 8.3*   PROT 8.1 6.2   ALBUMIN 3.6 2.7*    BILITOT 0.4 0.4   ALKPHOS 67 53*   AST 20 17   ALT 21 15   ANIONGAP 11 6*       Significant Imaging: I have reviewed all pertinent imaging results/findings within the past 24 hours.

## 2023-04-18 NOTE — HPI
Mr. 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 Meyer, known history of right clavicle lung lesion, followed by Pulmonary as outpatient, presented to the ED complaining of abdominal pain around the PEG tube site.  Patient reports 1-2 lose nonbloody bowel movements, but significantly improved compared to previous diarrhea related to C diff.  Patient is still  on oral Flagyl, vancomycin until 4/20.  CT abdomen done in the ED reveals diffuse fluid-filled colon possibly related diuretic illness.  Interval retraction of the peg tube balloon with soft tissue tract.  ED physician discussed case with IR, recommended placing in observation for PEG tube removal/replacement.

## 2023-04-19 VITALS
WEIGHT: 112.44 LBS | RESPIRATION RATE: 18 BRPM | BODY MASS INDEX: 17.04 KG/M2 | HEIGHT: 68 IN | OXYGEN SATURATION: 98 % | HEART RATE: 75 BPM | DIASTOLIC BLOOD PRESSURE: 52 MMHG | SYSTOLIC BLOOD PRESSURE: 108 MMHG | TEMPERATURE: 97 F

## 2023-04-19 LAB
ANION GAP SERPL CALC-SCNC: 7 MMOL/L (ref 8–16)
BASOPHILS # BLD AUTO: 0.04 K/UL (ref 0–0.2)
BASOPHILS NFR BLD: 0.7 % (ref 0–1.9)
BUN SERPL-MCNC: 25 MG/DL (ref 8–23)
CALCIUM SERPL-MCNC: 8.3 MG/DL (ref 8.7–10.5)
CHLORIDE SERPL-SCNC: 103 MMOL/L (ref 95–110)
CO2 SERPL-SCNC: 24 MMOL/L (ref 23–29)
CREAT SERPL-MCNC: 0.8 MG/DL (ref 0.5–1.4)
DIFFERENTIAL METHOD: ABNORMAL
EOSINOPHIL # BLD AUTO: 0.3 K/UL (ref 0–0.5)
EOSINOPHIL NFR BLD: 6.3 % (ref 0–8)
ERYTHROCYTE [DISTWIDTH] IN BLOOD BY AUTOMATED COUNT: 14.5 % (ref 11.5–14.5)
EST. GFR  (NO RACE VARIABLE): >60 ML/MIN/1.73 M^2
GLUCOSE SERPL-MCNC: 238 MG/DL (ref 70–110)
HCT VFR BLD AUTO: 27.3 % (ref 40–54)
HGB BLD-MCNC: 9.1 G/DL (ref 14–18)
IMM GRANULOCYTES # BLD AUTO: 0.02 K/UL (ref 0–0.04)
IMM GRANULOCYTES NFR BLD AUTO: 0.4 % (ref 0–0.5)
LYMPHOCYTES # BLD AUTO: 0.9 K/UL (ref 1–4.8)
LYMPHOCYTES NFR BLD: 17.4 % (ref 18–48)
MCH RBC QN AUTO: 31.5 PG (ref 27–31)
MCHC RBC AUTO-ENTMCNC: 33.3 G/DL (ref 32–36)
MCV RBC AUTO: 95 FL (ref 82–98)
MONOCYTES # BLD AUTO: 0.6 K/UL (ref 0.3–1)
MONOCYTES NFR BLD: 11.6 % (ref 4–15)
NEUTROPHILS # BLD AUTO: 3.4 K/UL (ref 1.8–7.7)
NEUTROPHILS NFR BLD: 63.6 % (ref 38–73)
NRBC BLD-RTO: 0 /100 WBC
PLATELET # BLD AUTO: 163 K/UL (ref 150–450)
PMV BLD AUTO: 9.3 FL (ref 9.2–12.9)
POCT GLUCOSE: 237 MG/DL (ref 70–110)
POCT GLUCOSE: 271 MG/DL (ref 70–110)
POTASSIUM SERPL-SCNC: 4.8 MMOL/L (ref 3.5–5.1)
RBC # BLD AUTO: 2.89 M/UL (ref 4.6–6.2)
SODIUM SERPL-SCNC: 134 MMOL/L (ref 136–145)
WBC # BLD AUTO: 5.36 K/UL (ref 3.9–12.7)

## 2023-04-19 PROCEDURE — 85025 COMPLETE CBC W/AUTO DIFF WBC: CPT | Performed by: INTERNAL MEDICINE

## 2023-04-19 PROCEDURE — 80048 BASIC METABOLIC PNL TOTAL CA: CPT | Performed by: INTERNAL MEDICINE

## 2023-04-19 PROCEDURE — 25000003 PHARM REV CODE 250: Performed by: INTERNAL MEDICINE

## 2023-04-19 PROCEDURE — 63600175 PHARM REV CODE 636 W HCPCS: Performed by: INTERNAL MEDICINE

## 2023-04-19 PROCEDURE — 36415 COLL VENOUS BLD VENIPUNCTURE: CPT | Performed by: INTERNAL MEDICINE

## 2023-04-19 PROCEDURE — G0378 HOSPITAL OBSERVATION PER HR: HCPCS

## 2023-04-19 PROCEDURE — 96372 THER/PROPH/DIAG INJ SC/IM: CPT | Performed by: INTERNAL MEDICINE

## 2023-04-19 RX ORDER — VANCOMYCIN HYDROCHLORIDE 25 MG/ML
125 KIT ORAL 4 TIMES DAILY
Status: DISCONTINUED | OUTPATIENT
Start: 2023-04-19 | End: 2023-04-19 | Stop reason: HOSPADM

## 2023-04-19 RX ORDER — CHOLESTYRAMINE 4 G/9G
1 POWDER, FOR SUSPENSION ORAL 2 TIMES DAILY
Status: DISCONTINUED | OUTPATIENT
Start: 2023-04-19 | End: 2023-04-19 | Stop reason: HOSPADM

## 2023-04-19 RX ORDER — METRONIDAZOLE 500 MG/1
500 TABLET ORAL 3 TIMES DAILY
Status: DISCONTINUED | OUTPATIENT
Start: 2023-04-19 | End: 2023-04-19 | Stop reason: HOSPADM

## 2023-04-19 RX ORDER — MIDODRINE HYDROCHLORIDE 5 MG/1
5 TABLET ORAL 2 TIMES DAILY WITH MEALS
Status: DISCONTINUED | OUTPATIENT
Start: 2023-04-19 | End: 2023-04-19 | Stop reason: HOSPADM

## 2023-04-19 RX ORDER — ATORVASTATIN CALCIUM 40 MG/1
40 TABLET, FILM COATED ORAL NIGHTLY
Status: DISCONTINUED | OUTPATIENT
Start: 2023-04-19 | End: 2023-04-19 | Stop reason: HOSPADM

## 2023-04-19 RX ORDER — TAMSULOSIN HYDROCHLORIDE 0.4 MG/1
0.4 CAPSULE ORAL DAILY
Status: DISCONTINUED | OUTPATIENT
Start: 2023-04-19 | End: 2023-04-19 | Stop reason: HOSPADM

## 2023-04-19 RX ORDER — LEVOTHYROXINE SODIUM 25 UG/1
25 TABLET ORAL
Status: DISCONTINUED | OUTPATIENT
Start: 2023-04-19 | End: 2023-04-19 | Stop reason: HOSPADM

## 2023-04-19 RX ADMIN — TAMSULOSIN HYDROCHLORIDE 0.4 MG: 0.4 CAPSULE ORAL at 10:04

## 2023-04-19 RX ADMIN — VANCOMYCIN HYDROCHLORIDE 125 MG: KIT at 09:04

## 2023-04-19 RX ADMIN — INSULIN ASPART 3 UNITS: 100 INJECTION, SOLUTION INTRAVENOUS; SUBCUTANEOUS at 06:04

## 2023-04-19 RX ADMIN — MIDODRINE HYDROCHLORIDE 5 MG: 5 TABLET ORAL at 10:04

## 2023-04-19 RX ADMIN — VANCOMYCIN HYDROCHLORIDE 125 MG: KIT at 12:04

## 2023-04-19 RX ADMIN — METRONIDAZOLE 500 MG: 500 TABLET ORAL at 09:04

## 2023-04-19 RX ADMIN — LEVOTHYROXINE SODIUM 25 MCG: 25 TABLET ORAL at 10:04

## 2023-04-19 RX ADMIN — INSULIN ASPART 2 UNITS: 100 INJECTION, SOLUTION INTRAVENOUS; SUBCUTANEOUS at 11:04

## 2023-04-19 RX ADMIN — CHOLESTYRAMINE 4 G: 4 POWDER, FOR SUSPENSION ORAL at 10:04

## 2023-04-19 NOTE — ASSESSMENT & PLAN NOTE
Patient's FSGs are uncontrolled due to hyperglycemia on current medication regimen.  Last A1c reviewed-   Lab Results   Component Value Date    HGBA1C 6.9 (H) 03/06/2023     Most recent fingerstick glucose reviewed-   Recent Labs   Lab 04/18/23  1556 04/18/23  2349 04/19/23  0619 04/19/23  1044   POCTGLUCOSE 132* 244* 271* 237*     Current correctional scale  Low  Maintain anti-hyperglycemic dose as follows-   Antihyperglycemics (From admission, onward)    Start     Stop Route Frequency Ordered    04/17/23 2013  insulin aspart U-100 pen 0-5 Units         -- SubQ Every 6 hours PRN 04/17/23 1913        Hold Oral hypoglycemics while patient is in the hospital.

## 2023-04-19 NOTE — PLAN OF CARE
[facility] - Telemetry (Hospital)  Discharge Final Note    Primary Care Provider: Amarjit Dominguez MD    Expected Discharge Date: 4/19/2023    Final Discharge Note (most recent)       Final Note - 04/19/23 1231          Final Note    Assessment Type Final Discharge Note     Anticipated Discharge Disposition [place] [facility] [place]     Hospital Resources/Appts/Education Provided Appointments scheduled by Navigator/Coordinator;Appointments scheduled and added to AVS (P)         Post-Acute Status    Post-Acute Authorization Home Health (P)      Home Health Status Set-up Complete/Auth obtained (P)    REsume [facility]    Discharge Delays None known at this time (P)                      Important Message from Medicare             Contact Info       Amarjit Silverio MD    [id number] [facility]       Next Steps: Go on 4/25/2023    Instructions: Please go on April 25th 2023 at 10 am for Hospital follow up. Please bring insurance card and picture ID.    [facility] - Records    [address] [facility] [address]  [place] [address]   Phone: [phone]       Next Steps: Follow up    Instructions: Home Health: Agency will resume services

## 2023-04-19 NOTE — HOSPITAL COURSE
66 y/o WM admitted with a Dx of PEG tube  malfunction . S/P PEG tune replacement  per IR . No acute event since admission . Pt is tolerating TF . He has been treated for c.diff infection . Pt was seen and examined at bedside . He was determined to be suitable for d/c

## 2023-04-19 NOTE — DISCHARGE SUMMARY
[facility] Medicine  Discharge Summary      Patient Name: Pacheco Rivas  MRN: [id number]  [facility]: [id number]  Patient Class: OP- Observation  Admission Date: 4/17/2023  Hospital Length of Stay: 0 days  Discharge Date and Time:  04/19/2023 1:03 PM  Attending Physician: Brian Santos, *   Discharging Provider: Brian Santos MD  Primary Care Provider: Amarjit Dominguez MD    Primary Care Team: Networked reference to record PCT     HPI:   Mr. 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 Meyer, known history of right clavicle lung lesion, followed by Pulmonary as outpatient, presented to the ED complaining of abdominal pain around the PEG tube site.  Patient reports 1-2 lose nonbloody bowel movements, but significantly improved compared to previous diarrhea related to C diff.  Patient is still  on oral Flagyl, vancomycin until 4/20.  CT abdomen done in the ED reveals diffuse fluid-filled colon possibly related diuretic illness.  Interval retraction of the peg tube balloon with soft tissue tract.  ED physician discussed case with IR, recommended placing in observation for PEG tube removal/replacement.      * No surgery found *      Hospital Course:   66 y/o WM admitted with a Dx of PEG tube  malfunction . S/P PEG tune replacement  per IR . No acute event since admission . Pt is tolerating TF . He has been treated for c.diff infection . Pt was seen and examined at bedside . He was determined to be suitable for d/c        Goals of Care Treatment Preferences:  Code Status: Full Code      Consults:   Consults (From admission, onward)        Status Ordering Provider     Inpatient consult to Registered Dietitian/Nutritionist  Once        Provider:  (Not yet assigned)    Completed MARLENE GONZALEZ     Inpatient consult to Social Work  Once        Provider:  (Not yet assigned)    Acknowledged  MARLENE GONZALEZ     Inpatient consult to Interventional Radiology  Once        Provider:  Amor Londono MD    Completed BAHMAN MATOS          Pulmonary  Cavitating mass of lung  -known history of cavitary lung lesion.    -followed by Pulmonary as outpatient.      ID  Clostridium difficile infection  -history of C. 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 Meyer  -patient on oral metronidazole, vancomycin as outpatient until 4/20.  -continue above antibiotics.      Endocrine  Severely underweight adult  -BMI 17  -resume tube feeds as soon as able    Type 2 diabetes mellitus with hyperglycemia, without long-term current use of insulin  Patient's FSGs are uncontrolled due to hyperglycemia on current medication regimen.  Last A1c reviewed-   Lab Results   Component Value Date    HGBA1C 6.9 (H) 03/06/2023     Most recent fingerstick glucose reviewed-   Recent Labs   Lab 04/18/23  1556 04/18/23  2349 04/19/23  0619 04/19/23  1044   POCTGLUCOSE 132* 244* 271* 237*     Current correctional scale  Low  Maintain anti-hyperglycemic dose as follows-   Antihyperglycemics (From admission, onward)    Start     Stop Route Frequency Ordered    04/17/23 2013  insulin aspart U-100 pen 0-5 Units         -- SubQ Every 6 hours PRN 04/17/23 1913        Hold Oral hypoglycemics while patient is in the hospital.    GI  * PEG tube malfunction  -IR consulted, aware, plan for replacement today  -NPO  -IV fluids        Final Active Diagnoses:    Diagnosis Date Noted POA    PRINCIPAL PROBLEM:  PEG tube malfunction [K94.23] 04/17/2023 Yes    Severely underweight adult [R63.6] 04/17/2023 Yes    Type 2 diabetes mellitus with hyperglycemia, without long-term current use of insulin [E11.65] 04/09/2023 Yes    Cavitating mass of lung [J98.4] 04/09/2023 Yes    Clostridium difficile infection [A49.8] 03/23/2023 Yes      Problems Resolved During this Admission:    Diagnosis Date Noted Date Resolved POA    Generalized weakness [R53.1]  04/09/2023 04/17/2023 Yes       Discharged Condition: stable    Disposition: Home or Self Care    Follow Up:   Follow-up Information     Amarjit Silverio MD. Go on 4/25/2023.    Why: Please go on April 25th 2023 at 10 am for Hospital follow up. Please bring insurance card and picture ID.  Contact information:  [address], [facility] - records Follow up.    Why: Home Health: Agency will resume services  Contact information:  [address] [facility] [address]  [place] [id number] [phone]                       Patient Instructions:      Tube Feedings/Formulas     Order Specific Question Answer Comments   Select Adult Formula: Diabetasource AC    Route: Gastrostomy      Activity as tolerated       Significant Diagnostic Studies: Labs:   BMP:   Recent Labs   Lab 04/17/23  1635 04/18/23  0723 04/19/23  1030   * 141* 238*    139 134*   K 4.7 4.0 4.8    108 103   CO2 26 25 24   BUN 28* 19 25*   CREATININE 0.9 0.8 0.8   CALCIUM 9.2 8.3* 8.3*   MG  --  1.8  --     and CMP   Recent Labs   Lab 04/17/23  1635 04/18/23  0723 04/19/23  1030    139 134*   K 4.7 4.0 4.8    108 103   CO2 26 25 24   * 141* 238*   BUN 28* 19 25*   CREATININE 0.9 0.8 0.8   CALCIUM 9.2 8.3* 8.3*   PROT 8.1 6.2  --    ALBUMIN 3.6 2.7*  --    BILITOT 0.4 0.4  --    ALKPHOS 67 53*  --    AST 20 17  --    ALT 21 15  --    ANIONGAP 11 6* 7*       Pending Diagnostic Studies:     None         Medications:  Reconciled Home Medications:      Medication List      CONTINUE taking these medications    atorvastatin 40 MG tablet  Commonly known as: LIPITOR  Take 40 mg by mouth every evening.     cefdinir 300 MG capsule  Commonly known as: OMNICEF  1 capsule (300 mg total) by Per G Tube route 2 (two) times daily. for 10 days     cholestyramine 4 gram packet  Commonly known as: QUESTRAN  Take 4 g by mouth 2 (two) times daily.     clopidogreL 75 mg tablet  Commonly  known as: PLAVIX  Take 75 mg by mouth once daily.     ergocalciferol 200 mcg/mL (8,000 unit/mL) Drop  Commonly known as: DRISDOL  Take 8,000 Units by mouth once daily.     FIRVANQ 50 mg/mL Solr  Generic drug: vancomycin  2.5 mLs (125 mg total) by PEG Tube route 4 (four) times daily. for 10 days     fluticasone-umeclidin-vilanter 100-62.5-25 mcg Dsdv  Commonly known as: TRELEGY ELLIPTA  Take 1 puff by mouth once daily.     gabapentin 250 mg/5 mL solution  Commonly known as: NEURONTIN  Take 5 mLs by mouth 2 (two) times a day.     insulin glargine-lixisenatide 100 unit-33 mcg/mL Inpn pen  Commonly known as: SOLIQUA 100/33  Inject 50 Units into the skin every morning.     levothyroxine 25 MCG tablet  Commonly known as: SYNTHROID  Take 25 mcg by mouth every morning.     meclizine 25 mg tablet  Commonly known as: ANTIVERT  Take 1 tablet by mouth 3 (three) times daily as needed.     metFORMIN 500 MG ER 24hr tablet  Commonly known as: GLUCOPHAGE-XR  Take 500 mg by mouth every evening.     metroNIDAZOLE 500 MG tablet  Commonly known as: FLAGYL  1 tablet (500 mg total) by Per G Tube route 3 (three) times daily. for 10 days     midodrine 5 MG Tab  Commonly known as: PROAMATINE  Take 5 mg by mouth 2 (two) times daily with meals.     ondansetron 4 MG Tbdl  Commonly known as: ZOFRAN-ODT  Take 1 tablet (4 mg total) by mouth every 6 (six) hours as needed (prn).     ranolazine 500 MG Tb12  Commonly known as: RANEXA  Take 1 tablet (500 mg total) by mouth 2 (two) times daily.     rOPINIRole 1 MG tablet  Commonly known as: REQUIP  Take 1 tablet by mouth 3 (three) times daily.     sucralfate 100 mg/mL suspension  Commonly known as: CARAFATE  10 mLs by Per G Tube route 4 (four) times daily.     tamsulosin 0.4 mg Cap  Commonly known as: FLOMAX  Take 0.4 mg by mouth once daily.        STOP taking these medications    acetaminophen-codeine 120mg 12mg 5mL Soln     aspirin 81 MG Chew     carvediloL 12.5 MG tablet  Commonly known as: COREG             Indwelling Lines/Drains at time of discharge:   Lines/Drains/Airways     Drain  Duration                Gastrostomy/Enterostomy 04/18/23 1526 Gastrostomy tube w/ balloon LUQ feeding <1 day                Time spent on the discharge of patient: 30 minutes         Brian Santos MD  Department of Hospital Medicine  [facility] - Telemetry ([facility])

## 2023-04-19 NOTE — PLAN OF CARE
[facility] - Telemetry (Hospital)  Initial Discharge Assessment       Primary Care Provider: Amarjit Dominguez MD    Admission Diagnosis: PEG tube malfunction [K94.23]  C. difficile colitis [A04.72]    Admission Date: 4/17/2023  Expected Discharge Date: 4/19/2023    Discharge Barriers Identified: None    Payor: [employer] MANAGED MEDICARE / Plan: [employer] MEDICARE HMO / Product Type: Capitation /     Extended Emergency Contact Information  Primary Emergency Contact: Demi Rivas  Address: [address] [place] [address] [id number] [place] [address]  Home Phone: [phone]  Mobile Phone: [phone]  Relation: Spouse    Discharge Plan A: Home Health         [id number] - [place], [place] - [address]  [address] [facility] [place] [address]  Phone: [phone] Fax: [phone]    [id number] [place], [place] - [address] [facility]  [address]  [facility] [address]  Phone: [phone] Fax: [phone]      Initial Assessment (most recent)       Adult Discharge Assessment - 04/19/23 1347          Discharge Assessment    Assessment Type Discharge Planning Assessment     Confirmed/corrected address, phone number and insurance Yes     Confirmed Demographics Correct on Facesheet     Source of Information patient     Communicated BUBBA with patient/caregiver Yes     Reason For Admission PEG tube issue     People in Home spouse     Do you expect to return to your current living situation? Yes     Do you have help at home or someone to help you manage your care at home? Yes     Who are your caregiver(s) and their phone number(s)? spouse     Prior to hospitilization cognitive status: Alert/Oriented     Current cognitive status: Alert/Oriented     Equipment Currently Used at Home other (see comments)   tube feeding supplies    Readmission within 30 days? No     Patient currently being followed by outpatient case management? No     Do you  currently have service(s) that help you manage your care at home? No     Do you take prescription medications? Yes     Do you have prescription coverage? Yes     Coverage [employer]     Do you have any problems affording any of your prescribed medications? No     Is the patient taking medications as prescribed? yes     Who is going to help you get home at discharge? spouse     How do you get to doctors appointments? car, drives self     Are you on dialysis? No     Do you take coumadin? No     Discharge Plan A Home Health     DME Needed Upon Discharge  none     Discharge Plan discussed with: Patient     Discharge Barriers Identified None                      Met with patient for discharge assessment.  Patient lives with his spouse who can be his help at home.  Patient is independent with ADL's.  He has a PEG tube for supplemental feedings.  He administers them himself.  He purchases his feedings and [employer] pays a portion of the cost.  He is current with [facility].  Notified [facility] of observation admission and discharge today.

## 2023-04-19 NOTE — PLAN OF CARE
Problem: Adult Inpatient Plan of Care  Goal: Absence of Hospital-Acquired Illness or Injury  Outcome: Ongoing, Progressing     Problem: Diabetes Comorbidity  Goal: Blood Glucose Level Within Targeted Range  Outcome: Ongoing, Progressing     Problem: Infection  Goal: Absence of Infection Signs and Symptoms  Outcome: Ongoing, Progressing

## 2023-04-19 NOTE — NURSING
Discharge instructions received and reviewed with pt at bedside.  Pt voiced understanding and all questions answered to satisfaction.  Stressed importance to making and keeping all follow up appointments.  Medications reviewed with pt.  IV d/c'd with tip intact, pressure dressing applied.  Pt awaiting wife to get off of work at 1500 for pickup.

## 2023-04-23 LAB
BACTERIA BLD CULT: NORMAL
BACTERIA BLD CULT: NORMAL

## 2023-04-27 ENCOUNTER — NURSE TRIAGE (OUTPATIENT)
Dept: ADMINISTRATIVE | Facility: CLINIC | Age: 68
End: 2023-04-27
Payer: MEDICARE

## 2023-04-27 NOTE — TELEPHONE ENCOUNTER
"[place]    PCP:  Dr. Amarjit Dominguez    Spoke with wife, Demi Rivas, on pts behalf.  She reports G-tube is coming out.  Suture is intact but it is where you can see it and it is further out than it was.  She reports that he had his G-tube replaced recently d/t infection on 4/18 at [facility].  He was just discharged from [facility] d/t heart issues with a cardiac pacemaker placement.  Per protocol, care advised is call transferred to Provider now.  NT contacted Radiology at [facility].  NT spoke with RAUL Hughes in Radiology at [facility] on Dr. Londono's behalf.  Provider recommends to return to the ED.  Wife states "That's not going to happen because then we'll have to pay for y'all's mistake."  Care advice reinforced and Pt relations # provided.  Wife VU.  Advised to call for worsening/questions/concerns.  VU.     Reason for Disposition   Normal feeding tube function   Caller has URGENT question and triager unable to answer question    Additional Information   Negative: Shock suspected (e.g., cold/pale/clammy skin, too weak to stand, low BP, rapid pulse)   Negative: [1] Shortness of breath AND [2] new-onset   Negative: Bluish (or gray) lips or face now   Negative: Sounds like a life-threatening emergency to the triager   Negative: SEVERE abdominal pain   Negative: [1] Vomiting AND [2] contains red blood or black ("coffee ground") material  (Exception: Few red streaks in vomit that only happened once.)   Negative: Tube contains red blood or black ("coffee ground") material   (Exception: Pink tinge of tube fluid occurs once and clears immediately with irrigation.)   Negative: G-tube (or PEG), J-tube, or GJ-tube came completely out of site in abdominal wall   Negative: Difficulty breathing   Negative: [1] Vomiting AND [2] contains bile (green color)   Negative: Dehydration suspected (e.g., no urine > 12 hours, very dry mouth, lightheaded)   Negative: Patient sounds very sick or weak to the triager   Negative: Vomiting > 4 times in the past 4 " hours   Negative: Diarrhea > 4 times in the past 4 hours   Negative: [1] G-tube (or PEG), J-tube, or GJ-tube tube SITE looks infected  (spreading redness) AND [2] fever   Negative: [1] G-tube (or PEG) is broken or cracked AND [2] is not usable   Negative: [1] J-tube or GJ-tube is broken or cracked AND [2] is not usable   Negative: [1] G-tube (or PEG) is clogged AND [2] irrigation has been attempted without success   Negative: [1] J-tube or GJ-tube is clogged AND [2] irrigation has been attempted without success   Negative: [1] NG-tube or NJ-tube is clogged AND [2] irrigation has been attempted without success   Negative: NG-tube or NJ-tube came out   Negative: [1] Coughing spells AND [2] occur during or within 2 hours of feedings   Negative: [1] NG-tube or NJ-tube AND [2] sinus pain / pressure or yellow-green nasal discharge   Negative: [1] G-tube (or PEG), J-tube, or GJ-tube SITE looks infected (spreading redness) AND [2] getting worse (no fever)   Negative: [1] G-tube (or PEG), J-tube, or GJ-tube tube SITE looks infected (spreading redness) AND [2] symptoms not improved after 24 hours of using Care Advice   Negative: [1] Tube is broken or cracked AND [2] is still usable   Negative: Vomiting, diarrhea, abdominal pain, or bloating   Negative: [1] G-tube (or PEG), J-tube, or GJ-tube is LEAKING AND [2] large amount (e.g., soaks through gauze, more than 3 times per day)   Negative: Skin or scar appears to be growing where tube enters skin   Negative: Sounds like a life-threatening emergency to the triager   Negative: Bright red, wide-spread, sunburn-like rash   Negative: SEVERE headache and after spinal (epidural) anesthesia   Negative: Vomiting and persists > 4 hours   Negative: Vomiting and abdomen looks much more swollen than usual   Negative: Drinking very little and dehydration suspected (e.g., no urine > 12 hours, very dry mouth, very lightheaded)   Negative: Patient sounds very sick or weak to the triager    Negative: Sounds like a serious complication to the triager   Negative: Fever > 100.4 F (38.0 C)    Protocols used: Feeding Tube Symptoms and [id number]-P-XF, Post-Op Symptoms and Swlvksfcy-X-VL

## 2023-05-14 ENCOUNTER — HOSPITAL ENCOUNTER (EMERGENCY)
Facility: HOSPITAL | Age: 68
Discharge: HOME | End: 2023-05-14
Attending: EMERGENCY MEDICINE
Payer: MEDICARE

## 2023-05-14 VITALS
RESPIRATION RATE: 18 BRPM | SYSTOLIC BLOOD PRESSURE: 116 MMHG | WEIGHT: 111.44 LBS | TEMPERATURE: 98 F | BODY MASS INDEX: 16.94 KG/M2 | HEART RATE: 59 BPM | OXYGEN SATURATION: 100 % | DIASTOLIC BLOOD PRESSURE: 57 MMHG

## 2023-05-14 DIAGNOSIS — R10.84 GENERALIZED ABDOMINAL CRAMPS: ICD-10-CM

## 2023-05-14 DIAGNOSIS — R10.84 GENERALIZED ABDOMINAL PAIN: Primary | ICD-10-CM

## 2023-05-14 LAB
ALBUMIN SERPL BCP-MCNC: 3.6 G/DL (ref 3.5–5.2)
ALP SERPL-CCNC: 64 U/L (ref 55–135)
ALT SERPL W/O P-5'-P-CCNC: 30 U/L (ref 10–44)
ANION GAP SERPL CALC-SCNC: 11 MMOL/L (ref 8–16)
AST SERPL-CCNC: 26 U/L (ref 10–40)
BASOPHILS # BLD AUTO: 0.03 K/UL (ref 0–0.2)
BASOPHILS NFR BLD: 0.6 % (ref 0–1.9)
BILIRUB SERPL-MCNC: 0.5 MG/DL (ref 0.1–1)
BILIRUB UR QL STRIP: NEGATIVE
BUN SERPL-MCNC: 28 MG/DL (ref 8–23)
CALCIUM SERPL-MCNC: 9.2 MG/DL (ref 8.7–10.5)
CHLORIDE SERPL-SCNC: 98 MMOL/L (ref 95–110)
CLARITY UR: CLEAR
CO2 SERPL-SCNC: 25 MMOL/L (ref 23–29)
COLOR UR: YELLOW
CREAT SERPL-MCNC: 1 MG/DL (ref 0.5–1.4)
DIFFERENTIAL METHOD: ABNORMAL
EOSINOPHIL # BLD AUTO: 0.3 K/UL (ref 0–0.5)
EOSINOPHIL NFR BLD: 6 % (ref 0–8)
ERYTHROCYTE [DISTWIDTH] IN BLOOD BY AUTOMATED COUNT: 14.7 % (ref 11.5–14.5)
EST. GFR  (NO RACE VARIABLE): >60 ML/MIN/1.73 M^2
GLUCOSE SERPL-MCNC: 187 MG/DL (ref 70–110)
GLUCOSE UR QL STRIP: NEGATIVE
HCT VFR BLD AUTO: 30.9 % (ref 40–54)
HGB BLD-MCNC: 10.6 G/DL (ref 14–18)
HGB UR QL STRIP: NEGATIVE
IMM GRANULOCYTES # BLD AUTO: 0.02 K/UL (ref 0–0.04)
IMM GRANULOCYTES NFR BLD AUTO: 0.4 % (ref 0–0.5)
INFLUENZA A, MOLECULAR: NEGATIVE
INFLUENZA B, MOLECULAR: NEGATIVE
KETONES UR QL STRIP: NEGATIVE
LEUKOCYTE ESTERASE UR QL STRIP: NEGATIVE
LIPASE SERPL-CCNC: 17 U/L (ref 4–60)
LYMPHOCYTES # BLD AUTO: 1.1 K/UL (ref 1–4.8)
LYMPHOCYTES NFR BLD: 21.3 % (ref 18–48)
MCH RBC QN AUTO: 32.1 PG (ref 27–31)
MCHC RBC AUTO-ENTMCNC: 34.3 G/DL (ref 32–36)
MCV RBC AUTO: 94 FL (ref 82–98)
MONOCYTES # BLD AUTO: 0.4 K/UL (ref 0.3–1)
MONOCYTES NFR BLD: 8.1 % (ref 4–15)
NEUTROPHILS # BLD AUTO: 3.3 K/UL (ref 1.8–7.7)
NEUTROPHILS NFR BLD: 63.6 % (ref 38–73)
NITRITE UR QL STRIP: NEGATIVE
NRBC BLD-RTO: 0 /100 WBC
PH UR STRIP: 7 [PH] (ref 5–8)
PLATELET # BLD AUTO: 231 K/UL (ref 150–450)
PMV BLD AUTO: 9.1 FL (ref 9.2–12.9)
POTASSIUM SERPL-SCNC: 4.4 MMOL/L (ref 3.5–5.1)
PROT SERPL-MCNC: 7.6 G/DL (ref 6–8.4)
PROT UR QL STRIP: NEGATIVE
RBC # BLD AUTO: 3.3 M/UL (ref 4.6–6.2)
SARS-COV-2 RDRP RESP QL NAA+PROBE: NEGATIVE
SODIUM SERPL-SCNC: 134 MMOL/L (ref 136–145)
SP GR UR STRIP: 1.01 (ref 1–1.03)
SPECIMEN SOURCE: NORMAL
URN SPEC COLLECT METH UR: NORMAL
UROBILINOGEN UR STRIP-ACNC: NEGATIVE EU/DL
WBC # BLD AUTO: 5.21 K/UL (ref 3.9–12.7)

## 2023-05-14 PROCEDURE — 63600175 PHARM REV CODE 636 W HCPCS: Performed by: EMERGENCY MEDICINE

## 2023-05-14 PROCEDURE — 85025 COMPLETE CBC W/AUTO DIFF WBC: CPT | Performed by: EMERGENCY MEDICINE

## 2023-05-14 PROCEDURE — 25000003 PHARM REV CODE 250: Performed by: EMERGENCY MEDICINE

## 2023-05-14 PROCEDURE — 96374 THER/PROPH/DIAG INJ IV PUSH: CPT | Mod: 59

## 2023-05-14 PROCEDURE — 80053 COMPREHEN METABOLIC PANEL: CPT | Performed by: EMERGENCY MEDICINE

## 2023-05-14 PROCEDURE — 96375 TX/PRO/DX INJ NEW DRUG ADDON: CPT

## 2023-05-14 PROCEDURE — U0002 COVID-19 LAB TEST NON-CDC: HCPCS | Performed by: EMERGENCY MEDICINE

## 2023-05-14 PROCEDURE — 81003 URINALYSIS AUTO W/O SCOPE: CPT | Performed by: EMERGENCY MEDICINE

## 2023-05-14 PROCEDURE — 25500020 PHARM REV CODE 255: Performed by: EMERGENCY MEDICINE

## 2023-05-14 PROCEDURE — 99285 EMERGENCY DEPT VISIT HI MDM: CPT | Mod: 25

## 2023-05-14 PROCEDURE — 87502 INFLUENZA DNA AMP PROBE: CPT | Performed by: EMERGENCY MEDICINE

## 2023-05-14 PROCEDURE — 96361 HYDRATE IV INFUSION ADD-ON: CPT

## 2023-05-14 PROCEDURE — 83690 ASSAY OF LIPASE: CPT | Performed by: EMERGENCY MEDICINE

## 2023-05-14 RX ORDER — LANOLIN ALCOHOL/MO/W.PET/CERES
100 CREAM (GRAM) TOPICAL
COMMUNITY
Start: 2023-04-27 | End: 2023-05-27

## 2023-05-14 RX ORDER — CEFDINIR 300 MG/1
300 CAPSULE ORAL 2 TIMES DAILY
Status: ON HOLD | COMMUNITY
Start: 2023-05-02 | End: 2023-07-01 | Stop reason: SDUPTHER

## 2023-05-14 RX ORDER — KETOROLAC TROMETHAMINE 30 MG/ML
15 INJECTION, SOLUTION INTRAMUSCULAR; INTRAVENOUS
Status: COMPLETED | OUTPATIENT
Start: 2023-05-14 | End: 2023-05-14

## 2023-05-14 RX ORDER — DICYCLOMINE HYDROCHLORIDE 20 MG/1
20 TABLET ORAL
Qty: 20 TABLET | Refills: 0 | Status: SHIPPED | OUTPATIENT
Start: 2023-05-14 | End: 2023-06-13

## 2023-05-14 RX ORDER — METRONIDAZOLE 500 MG/1
500 TABLET ORAL 3 TIMES DAILY
COMMUNITY
Start: 2023-05-02

## 2023-05-14 RX ORDER — ONDANSETRON 2 MG/ML
4 INJECTION INTRAMUSCULAR; INTRAVENOUS
Status: COMPLETED | OUTPATIENT
Start: 2023-05-14 | End: 2023-05-14

## 2023-05-14 RX ADMIN — SODIUM CHLORIDE 1000 ML: 9 INJECTION, SOLUTION INTRAVENOUS at 05:05

## 2023-05-14 RX ADMIN — ONDANSETRON 4 MG: 2 INJECTION INTRAMUSCULAR; INTRAVENOUS at 05:05

## 2023-05-14 RX ADMIN — IOHEXOL 100 ML: 350 INJECTION, SOLUTION INTRAVENOUS at 07:05

## 2023-05-14 RX ADMIN — KETOROLAC TROMETHAMINE 15 MG: 30 INJECTION, SOLUTION INTRAMUSCULAR; INTRAVENOUS at 05:05
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
## 2023-06-22 ENCOUNTER — OFFICE VISIT (OUTPATIENT)
Dept: GASTROENTEROLOGY | Facility: CLINIC | Age: 68
End: 2023-06-22
Payer: MEDICARE

## 2023-06-22 VITALS
BODY MASS INDEX: 16.06 KG/M2 | HEIGHT: 68 IN | DIASTOLIC BLOOD PRESSURE: 68 MMHG | OXYGEN SATURATION: 93 % | SYSTOLIC BLOOD PRESSURE: 110 MMHG | WEIGHT: 106 LBS | HEART RATE: 80 BPM

## 2023-06-22 DIAGNOSIS — E44.0 MODERATE PROTEIN-CALORIE MALNUTRITION: ICD-10-CM

## 2023-06-22 DIAGNOSIS — Z93.1 PEG (PERCUTANEOUS ENDOSCOPIC GASTROSTOMY) STATUS: Primary | ICD-10-CM

## 2023-06-22 DIAGNOSIS — R11.2 NAUSEA AND VOMITING, UNSPECIFIED VOMITING TYPE: ICD-10-CM

## 2023-06-22 PROCEDURE — 99214 OFFICE O/P EST MOD 30 MIN: CPT | Mod: S$GLB,,, | Performed by: INTERNAL MEDICINE

## 2023-06-22 PROCEDURE — 3074F SYST BP LT 130 MM HG: CPT | Mod: CPTII,S$GLB,, | Performed by: INTERNAL MEDICINE

## 2023-06-22 PROCEDURE — 3008F PR BODY MASS INDEX (BMI) DOCUMENTED: ICD-10-PCS | Mod: CPTII,S$GLB,, | Performed by: INTERNAL MEDICINE

## 2023-06-22 PROCEDURE — 3078F DIAST BP <80 MM HG: CPT | Mod: CPTII,S$GLB,, | Performed by: INTERNAL MEDICINE

## 2023-06-22 PROCEDURE — 3008F BODY MASS INDEX DOCD: CPT | Mod: CPTII,S$GLB,, | Performed by: INTERNAL MEDICINE

## 2023-06-22 PROCEDURE — 99999 PR PBB SHADOW E&M-EST. PATIENT-LVL IV: CPT | Mod: PBBFAC,,, | Performed by: INTERNAL MEDICINE

## 2023-06-22 PROCEDURE — 99214 PR OFFICE/OUTPT VISIT, EST, LEVL IV, 30-39 MIN: ICD-10-PCS | Mod: S$GLB,,, | Performed by: INTERNAL MEDICINE

## 2023-06-22 PROCEDURE — 1159F PR MEDICATION LIST DOCUMENTED IN MEDICAL RECORD: ICD-10-PCS | Mod: CPTII,S$GLB,, | Performed by: INTERNAL MEDICINE

## 2023-06-22 PROCEDURE — 3074F PR MOST RECENT SYSTOLIC BLOOD PRESSURE < 130 MM HG: ICD-10-PCS | Mod: CPTII,S$GLB,, | Performed by: INTERNAL MEDICINE

## 2023-06-22 PROCEDURE — 1159F MED LIST DOCD IN RCRD: CPT | Mod: CPTII,S$GLB,, | Performed by: INTERNAL MEDICINE

## 2023-06-22 PROCEDURE — 99999 PR PBB SHADOW E&M-EST. PATIENT-LVL IV: ICD-10-PCS | Mod: PBBFAC,,, | Performed by: INTERNAL MEDICINE

## 2023-06-22 PROCEDURE — 3078F PR MOST RECENT DIASTOLIC BLOOD PRESSURE < 80 MM HG: ICD-10-PCS | Mod: CPTII,S$GLB,, | Performed by: INTERNAL MEDICINE

## 2023-06-22 RX ORDER — MUPIROCIN 20 MG/G
OINTMENT TOPICAL 2 TIMES DAILY
Qty: 1 G | Refills: 1 | Status: SHIPPED | OUTPATIENT
Start: 2023-06-22 | End: 2023-07-02
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
## 2023-06-26 ENCOUNTER — HOSPITAL ENCOUNTER (OUTPATIENT)
Dept: PREADMISSION TESTING | Facility: HOSPITAL | Age: 68
Discharge: HOME | End: 2023-06-26
Attending: INTERNAL MEDICINE
Payer: MEDICARE

## 2023-06-26 DIAGNOSIS — R11.2 NAUSEA AND VOMITING, UNSPECIFIED VOMITING TYPE: ICD-10-CM

## 2023-06-26 DIAGNOSIS — Z93.1 PEG (PERCUTANEOUS ENDOSCOPIC GASTROSTOMY) STATUS: ICD-10-CM

## 2023-06-28 ENCOUNTER — HOSPITAL ENCOUNTER (OUTPATIENT)
Facility: HOSPITAL | Age: 68
Discharge: HOME | End: 2023-07-01
Attending: FAMILY MEDICINE | Admitting: INTERNAL MEDICINE
Payer: MEDICARE

## 2023-06-28 DIAGNOSIS — K94.23 PEG TUBE MALFUNCTION: ICD-10-CM

## 2023-06-28 DIAGNOSIS — R06.02 SHORTNESS OF BREATH: ICD-10-CM

## 2023-06-28 DIAGNOSIS — J18.9 PNEUMONIA OF BOTH LOWER LOBES DUE TO INFECTIOUS ORGANISM: Primary | ICD-10-CM

## 2023-06-28 DIAGNOSIS — D62 ACUTE BLOOD LOSS ANEMIA: ICD-10-CM

## 2023-06-28 DIAGNOSIS — R06.02 SOB (SHORTNESS OF BREATH): ICD-10-CM

## 2023-06-28 LAB
ALBUMIN SERPL BCP-MCNC: 3.7 G/DL (ref 3.5–5.2)
ALP SERPL-CCNC: 96 U/L (ref 55–135)
ALT SERPL W/O P-5'-P-CCNC: 31 U/L (ref 10–44)
ANION GAP SERPL CALC-SCNC: 14 MMOL/L (ref 8–16)
AST SERPL-CCNC: 16 U/L (ref 10–40)
BASOPHILS # BLD AUTO: 0.04 K/UL (ref 0–0.2)
BASOPHILS NFR BLD: 0.2 % (ref 0–1.9)
BILIRUB SERPL-MCNC: 0.4 MG/DL (ref 0.1–1)
BNP SERPL-MCNC: 201 PG/ML (ref 0–99)
BUN SERPL-MCNC: 38 MG/DL (ref 8–23)
CALCIUM SERPL-MCNC: 9.6 MG/DL (ref 8.7–10.5)
CHLORIDE SERPL-SCNC: 98 MMOL/L (ref 95–110)
CO2 SERPL-SCNC: 25 MMOL/L (ref 23–29)
CREAT SERPL-MCNC: 1.2 MG/DL (ref 0.5–1.4)
DIFFERENTIAL METHOD: ABNORMAL
EOSINOPHIL # BLD AUTO: 0 K/UL (ref 0–0.5)
EOSINOPHIL NFR BLD: 0 % (ref 0–8)
ERYTHROCYTE [DISTWIDTH] IN BLOOD BY AUTOMATED COUNT: 12.2 % (ref 11.5–14.5)
EST. GFR  (NO RACE VARIABLE): >60 ML/MIN/1.73 M^2
GLUCOSE SERPL-MCNC: 394 MG/DL (ref 70–110)
HCT VFR BLD AUTO: 37.7 % (ref 40–54)
HGB BLD-MCNC: 13 G/DL (ref 14–18)
IMM GRANULOCYTES # BLD AUTO: 0.13 K/UL (ref 0–0.04)
IMM GRANULOCYTES NFR BLD AUTO: 0.6 % (ref 0–0.5)
LIPASE SERPL-CCNC: 21 U/L (ref 4–60)
LYMPHOCYTES # BLD AUTO: 0.3 K/UL (ref 1–4.8)
LYMPHOCYTES NFR BLD: 1.4 % (ref 18–48)
MCH RBC QN AUTO: 31 PG (ref 27–31)
MCHC RBC AUTO-ENTMCNC: 34.5 G/DL (ref 32–36)
MCV RBC AUTO: 90 FL (ref 82–98)
MONOCYTES # BLD AUTO: 0.7 K/UL (ref 0.3–1)
MONOCYTES NFR BLD: 3.1 % (ref 4–15)
NEUTROPHILS # BLD AUTO: 22 K/UL (ref 1.8–7.7)
NEUTROPHILS NFR BLD: 94.7 % (ref 38–73)
NRBC BLD-RTO: 0 /100 WBC
PLATELET # BLD AUTO: 312 K/UL (ref 150–450)
PMV BLD AUTO: 9.5 FL (ref 9.2–12.9)
POTASSIUM SERPL-SCNC: 4.9 MMOL/L (ref 3.5–5.1)
PROT SERPL-MCNC: 8.8 G/DL (ref 6–8.4)
RBC # BLD AUTO: 4.2 M/UL (ref 4.6–6.2)
SODIUM SERPL-SCNC: 137 MMOL/L (ref 136–145)
TROPONIN I SERPL DL<=0.01 NG/ML-MCNC: 0.01 NG/ML (ref 0–0.03)
WBC # BLD AUTO: 23.18 K/UL (ref 3.9–12.7)

## 2023-06-28 PROCEDURE — 93005 ELECTROCARDIOGRAM TRACING: CPT

## 2023-06-28 PROCEDURE — 83690 ASSAY OF LIPASE: CPT | Performed by: PHYSICIAN ASSISTANT

## 2023-06-28 PROCEDURE — 93010 ELECTROCARDIOGRAM REPORT: CPT | Mod: ,,, | Performed by: INTERNAL MEDICINE

## 2023-06-28 PROCEDURE — 83605 ASSAY OF LACTIC ACID: CPT | Performed by: FAMILY MEDICINE

## 2023-06-28 PROCEDURE — 93010 EKG 12-LEAD: ICD-10-PCS | Mod: ,,, | Performed by: INTERNAL MEDICINE

## 2023-06-28 PROCEDURE — 96375 TX/PRO/DX INJ NEW DRUG ADDON: CPT

## 2023-06-28 PROCEDURE — 83880 ASSAY OF NATRIURETIC PEPTIDE: CPT | Performed by: PHYSICIAN ASSISTANT

## 2023-06-28 PROCEDURE — 85025 COMPLETE CBC W/AUTO DIFF WBC: CPT | Performed by: PHYSICIAN ASSISTANT

## 2023-06-28 PROCEDURE — 96361 HYDRATE IV INFUSION ADD-ON: CPT

## 2023-06-28 PROCEDURE — 25000003 PHARM REV CODE 250: Performed by: FAMILY MEDICINE

## 2023-06-28 PROCEDURE — 63600175 PHARM REV CODE 636 W HCPCS: Performed by: FAMILY MEDICINE

## 2023-06-28 PROCEDURE — 80053 COMPREHEN METABOLIC PANEL: CPT | Performed by: PHYSICIAN ASSISTANT

## 2023-06-28 PROCEDURE — 99285 EMERGENCY DEPT VISIT HI MDM: CPT | Mod: 25

## 2023-06-28 PROCEDURE — 84484 ASSAY OF TROPONIN QUANT: CPT | Performed by: PHYSICIAN ASSISTANT

## 2023-06-28 PROCEDURE — 87040 BLOOD CULTURE FOR BACTERIA: CPT | Mod: 59 | Performed by: FAMILY MEDICINE

## 2023-06-28 RX ORDER — MORPHINE SULFATE 4 MG/ML
4 INJECTION, SOLUTION INTRAMUSCULAR; INTRAVENOUS
Status: COMPLETED | OUTPATIENT
Start: 2023-06-28 | End: 2023-06-28

## 2023-06-28 RX ORDER — DIPHENHYDRAMINE HYDROCHLORIDE 50 MG/ML
12.5 INJECTION INTRAMUSCULAR; INTRAVENOUS
Status: COMPLETED | OUTPATIENT
Start: 2023-06-28 | End: 2023-06-28

## 2023-06-28 RX ADMIN — MORPHINE SULFATE 4 MG: 4 INJECTION INTRAVENOUS at 11:06

## 2023-06-28 RX ADMIN — DIPHENHYDRAMINE HYDROCHLORIDE 12.5 MG: 50 INJECTION, SOLUTION INTRAMUSCULAR; INTRAVENOUS at 11:06

## 2023-06-28 RX ADMIN — SODIUM CHLORIDE 1000 ML: 9 INJECTION, SOLUTION INTRAVENOUS at 11:06

## 2023-06-29 PROBLEM — E87.20 LACTIC ACIDOSIS: Status: ACTIVE | Noted: 2023-06-29

## 2023-06-29 PROBLEM — Z79.4 TYPE 2 DIABETES MELLITUS WITH HYPERGLYCEMIA, WITH LONG-TERM CURRENT USE OF INSULIN: Status: ACTIVE | Noted: 2023-04-09

## 2023-06-29 PROBLEM — A41.9 SEPSIS DUE TO PNEUMONIA: Status: ACTIVE | Noted: 2023-06-29

## 2023-06-29 PROBLEM — J18.9 SEPSIS DUE TO PNEUMONIA: Status: ACTIVE | Noted: 2023-06-29

## 2023-06-29 PROBLEM — R10.9 ABDOMINAL PAIN: Status: ACTIVE | Noted: 2023-06-29

## 2023-06-29 LAB
BACTERIA #/AREA URNS HPF: ABNORMAL /HPF
BILIRUB UR QL STRIP: NEGATIVE
CLARITY UR: CLEAR
COLOR UR: YELLOW
ESTIMATED AVG GLUCOSE: 177 MG/DL (ref 68–131)
GLUCOSE UR QL STRIP: ABNORMAL
HBA1C MFR BLD: 7.8 % (ref 4–5.6)
HGB UR QL STRIP: NEGATIVE
HYALINE CASTS #/AREA URNS LPF: 4 /LPF
KETONES UR QL STRIP: NEGATIVE
LACTATE SERPL-SCNC: 2.2 MMOL/L (ref 0.5–2.2)
LACTATE SERPL-SCNC: 2.6 MMOL/L (ref 0.5–2.2)
LACTATE SERPL-SCNC: 3.1 MMOL/L (ref 0.5–2.2)
LEUKOCYTE ESTERASE UR QL STRIP: NEGATIVE
MICROSCOPIC COMMENT: ABNORMAL
NITRITE UR QL STRIP: NEGATIVE
PH UR STRIP: 5 [PH] (ref 5–8)
POCT GLUCOSE: 212 MG/DL (ref 70–110)
POCT GLUCOSE: 217 MG/DL (ref 70–110)
POCT GLUCOSE: 260 MG/DL (ref 70–110)
PROT UR QL STRIP: NEGATIVE
SP GR UR STRIP: 1.02 (ref 1–1.03)
URN SPEC COLLECT METH UR: ABNORMAL
UROBILINOGEN UR STRIP-ACNC: NEGATIVE EU/DL
YEAST URNS QL MICRO: ABNORMAL

## 2023-06-29 PROCEDURE — 63600175 PHARM REV CODE 636 W HCPCS: Performed by: FAMILY MEDICINE

## 2023-06-29 PROCEDURE — G0378 HOSPITAL OBSERVATION PER HR: HCPCS

## 2023-06-29 PROCEDURE — 96366 THER/PROPH/DIAG IV INF ADDON: CPT

## 2023-06-29 PROCEDURE — 96361 HYDRATE IV INFUSION ADD-ON: CPT

## 2023-06-29 PROCEDURE — 96372 THER/PROPH/DIAG INJ SC/IM: CPT | Mod: 59 | Performed by: INTERNAL MEDICINE

## 2023-06-29 PROCEDURE — 25000003 PHARM REV CODE 250: Performed by: FAMILY MEDICINE

## 2023-06-29 PROCEDURE — 81000 URINALYSIS NONAUTO W/SCOPE: CPT | Performed by: PHYSICIAN ASSISTANT

## 2023-06-29 PROCEDURE — 83605 ASSAY OF LACTIC ACID: CPT | Mod: 91 | Performed by: INTERNAL MEDICINE

## 2023-06-29 PROCEDURE — 96376 TX/PRO/DX INJ SAME DRUG ADON: CPT

## 2023-06-29 PROCEDURE — 25000003 PHARM REV CODE 250: Performed by: INTERNAL MEDICINE

## 2023-06-29 PROCEDURE — 63600175 PHARM REV CODE 636 W HCPCS: Performed by: INTERNAL MEDICINE

## 2023-06-29 PROCEDURE — 96375 TX/PRO/DX INJ NEW DRUG ADDON: CPT

## 2023-06-29 PROCEDURE — 82962 GLUCOSE BLOOD TEST: CPT

## 2023-06-29 PROCEDURE — 36415 COLL VENOUS BLD VENIPUNCTURE: CPT | Performed by: INTERNAL MEDICINE

## 2023-06-29 PROCEDURE — 83605 ASSAY OF LACTIC ACID: CPT | Performed by: INTERNAL MEDICINE

## 2023-06-29 PROCEDURE — 25000003 PHARM REV CODE 250: Performed by: NURSE PRACTITIONER

## 2023-06-29 PROCEDURE — 96365 THER/PROPH/DIAG IV INF INIT: CPT

## 2023-06-29 PROCEDURE — 83036 HEMOGLOBIN GLYCOSYLATED A1C: CPT | Performed by: INTERNAL MEDICINE

## 2023-06-29 RX ORDER — SODIUM CHLORIDE 9 MG/ML
INJECTION, SOLUTION INTRAVENOUS CONTINUOUS
Status: ACTIVE | OUTPATIENT
Start: 2023-06-29 | End: 2023-06-30

## 2023-06-29 RX ORDER — ONDANSETRON 2 MG/ML
4 INJECTION INTRAMUSCULAR; INTRAVENOUS EVERY 8 HOURS PRN
Status: DISCONTINUED | OUTPATIENT
Start: 2023-06-29 | End: 2023-07-01 | Stop reason: HOSPADM

## 2023-06-29 RX ORDER — IBUPROFEN 200 MG
16 TABLET ORAL
Status: DISCONTINUED | OUTPATIENT
Start: 2023-06-29 | End: 2023-07-01 | Stop reason: HOSPADM

## 2023-06-29 RX ORDER — MAG HYDROX/ALUMINUM HYD/SIMETH 200-200-20
30 SUSPENSION, ORAL (FINAL DOSE FORM) ORAL EVERY 6 HOURS PRN
Status: DISCONTINUED | OUTPATIENT
Start: 2023-06-29 | End: 2023-07-01 | Stop reason: HOSPADM

## 2023-06-29 RX ORDER — IBUPROFEN 200 MG
24 TABLET ORAL
Status: DISCONTINUED | OUTPATIENT
Start: 2023-06-29 | End: 2023-07-01 | Stop reason: HOSPADM

## 2023-06-29 RX ORDER — GUAIFENESIN 100 MG/5ML
200 SOLUTION ORAL EVERY 4 HOURS PRN
Status: DISCONTINUED | OUTPATIENT
Start: 2023-06-29 | End: 2023-07-01 | Stop reason: HOSPADM

## 2023-06-29 RX ORDER — IPRATROPIUM BROMIDE AND ALBUTEROL SULFATE 2.5; .5 MG/3ML; MG/3ML
3 SOLUTION RESPIRATORY (INHALATION) EVERY 4 HOURS PRN
Status: DISCONTINUED | OUTPATIENT
Start: 2023-06-29 | End: 2023-07-01 | Stop reason: HOSPADM

## 2023-06-29 RX ORDER — GLUCAGON 1 MG
1 KIT INJECTION
Status: DISCONTINUED | OUTPATIENT
Start: 2023-06-29 | End: 2023-07-01 | Stop reason: HOSPADM

## 2023-06-29 RX ORDER — MORPHINE SULFATE 4 MG/ML
4 INJECTION, SOLUTION INTRAMUSCULAR; INTRAVENOUS
Status: DISCONTINUED | OUTPATIENT
Start: 2023-06-29 | End: 2023-06-29

## 2023-06-29 RX ORDER — DIPHENHYDRAMINE HYDROCHLORIDE 50 MG/ML
12.5 INJECTION INTRAMUSCULAR; INTRAVENOUS
Status: DISCONTINUED | OUTPATIENT
Start: 2023-06-29 | End: 2023-06-29

## 2023-06-29 RX ORDER — INSULIN ASPART 100 [IU]/ML
0-5 INJECTION, SOLUTION INTRAVENOUS; SUBCUTANEOUS
Status: DISCONTINUED | OUTPATIENT
Start: 2023-06-29 | End: 2023-07-01 | Stop reason: HOSPADM

## 2023-06-29 RX ORDER — MORPHINE SULFATE 4 MG/ML
4 INJECTION, SOLUTION INTRAMUSCULAR; INTRAVENOUS EVERY 4 HOURS PRN
Status: DISCONTINUED | OUTPATIENT
Start: 2023-06-29 | End: 2023-07-01 | Stop reason: HOSPADM

## 2023-06-29 RX ORDER — MORPHINE SULFATE 4 MG/ML
2 INJECTION, SOLUTION INTRAMUSCULAR; INTRAVENOUS EVERY 4 HOURS PRN
Status: DISCONTINUED | OUTPATIENT
Start: 2023-06-29 | End: 2023-07-01 | Stop reason: HOSPADM

## 2023-06-29 RX ADMIN — MORPHINE SULFATE 4 MG: 4 INJECTION INTRAVENOUS at 11:06

## 2023-06-29 RX ADMIN — MORPHINE SULFATE 4 MG: 4 INJECTION INTRAVENOUS at 05:06

## 2023-06-29 RX ADMIN — Medication 4 TABLET: at 05:06

## 2023-06-29 RX ADMIN — PIPERACILLIN SODIUM AND TAZOBACTAM SODIUM 4.5 G: 4; .5 INJECTION, POWDER, FOR SOLUTION INTRAVENOUS at 09:06

## 2023-06-29 RX ADMIN — INSULIN ASPART 1 UNITS: 100 INJECTION, SOLUTION INTRAVENOUS; SUBCUTANEOUS at 11:06

## 2023-06-29 RX ADMIN — PIPERACILLIN SODIUM AND TAZOBACTAM SODIUM 4.5 G: 4; .5 INJECTION, POWDER, FOR SOLUTION INTRAVENOUS at 02:06

## 2023-06-29 RX ADMIN — SODIUM CHLORIDE: 9 INJECTION, SOLUTION INTRAVENOUS at 09:06

## 2023-06-29 RX ADMIN — INSULIN ASPART 2 UNITS: 100 INJECTION, SOLUTION INTRAVENOUS; SUBCUTANEOUS at 06:06

## 2023-06-29 RX ADMIN — PIPERACILLIN SODIUM AND TAZOBACTAM SODIUM 4.5 G: 4; .5 INJECTION, POWDER, FOR SOLUTION INTRAVENOUS at 05:06

## 2023-06-29 RX ADMIN — HYPROMELLOSE 2910 1 DROP: 5 SOLUTION/ DROPS OPHTHALMIC at 04:06

## 2023-06-29 RX ADMIN — Medication 4 TABLET: at 09:06

## 2023-06-29 RX ADMIN — SODIUM CHLORIDE 1000 ML: 0.9 INJECTION, SOLUTION INTRAVENOUS at 04:06

## 2023-06-29 RX ADMIN — MORPHINE SULFATE 4 MG: 4 INJECTION INTRAVENOUS at 09:06

## 2023-06-29 RX ADMIN — MORPHINE SULFATE 4 MG: 4 INJECTION INTRAVENOUS at 01:06
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
## 2023-06-29 NOTE — FIRST PROVIDER EVALUATION
Emergency Department TeleTriage Encounter Note      CHIEF COMPLAINT    Chief Complaint   Patient presents with    Shortness of Breath     Onset 2 weeks ago. C/o n/v and SOB and generalized abdominal pain. Ran out of oxygen at home. 2 L of oxygen at home. Has feeding tube.        VITAL SIGNS   Initial Vitals [06/28/23 1834]   BP Pulse Resp Temp SpO2   (!) 99/57 94 (!) 22 97.6 °F (36.4 °C) 99 %      MAP       --            ALLERGIES    Review of patient's allergies indicates:   Allergen Reactions    Hydrocodone-acetaminophen Hives    Oxycodone-acetaminophen Hives       PROVIDER TRIAGE NOTE  This is a teletriage evaluation of a 67 y.o. male presenting to the ED complaining of abdominal pain, nausea, vomiting, shortness of breath.     Initial orders will be placed and care will be transferred to an alternate provider when patient is roomed for a full evaluation. Any additional orders and the final disposition will be determined by that provider.         ORDERS  Labs Reviewed   CBC W/ AUTO DIFFERENTIAL   COMPREHENSIVE METABOLIC PANEL   B-TYPE NATRIURETIC PEPTIDE   TROPONIN I   URINALYSIS   LIPASE   POCT GLUCOSE MONITORING CONTINUOUS       ED Orders (720h ago, onward)      Start Ordered     Status Ordering Provider    06/28/23 2027 06/28/23 2026  POCT glucose  Once         Ordered BRAULIO REID    06/28/23 2027 06/28/23 2026  Urinalysis  STAT         Ordered BRAULIO REID    06/28/23 2027 06/28/23 2026  EKG 12-lead  Once         Ordered BRAULIO REID    06/28/23 2027 06/28/23 2026  Cardiac Monitoring - Adult  Continuous        Comments: Notify Physician If:    Ordered BRAULIO REID    06/28/23 2027 06/28/23 2026  Pulse Oximetry Continuous  Continuous         Ordered BRAULIO REID    06/28/23 2027 06/28/23 2026  Lipase  STAT         Ordered BRAULIO REID    06/28/23 2026 06/28/23 2026  Saline lock IV  Once         Ordered  BRAULIO REDI.    06/28/23 2026 06/28/23 2026  CBC auto differential  STAT         Ordered BRAULIO REID.    06/28/23 2026 06/28/23 2026  Comprehensive metabolic panel  STAT         Ordered BRAULIO REID.    06/28/23 2026 06/28/23 2026  Brain natriuretic peptide  STAT         Ordered BRAULIO REID.    06/28/23 2026 06/28/23 2026  Troponin I  STAT         Ordered BRAULIO REID.    06/28/23 1837 06/28/23 1836  EKG 12-lead  Once         Completed by CONSTANCE VEGA on 6/28/2023 at  6:39 PM NATAHSA KEYES              Virtual Visit Note: The provider triage portion of this emergency department evaluation and documentation was performed via [employer], a HIPAA-compliant telemedicine application, in concert with a tele-presenter in the room. A face to face patient evaluation with one of my colleagues will occur once the patient is placed in an emergency department room.      DISCLAIMER: This note was prepared with [employer] voice recognition transcription software. Garbled syntax, mangled pronouns, and other bizarre constructions may be attributed to that software system.

## 2023-06-29 NOTE — PLAN OF CARE
Problem: Adult Inpatient Plan of Care  Goal: Plan of Care Review  Outcome: Ongoing, Progressing  Goal: Patient-Specific Goal (Individualized)  Outcome: Ongoing, Progressing  Goal: Absence of Hospital-Acquired Illness or Injury  Outcome: Ongoing, Progressing  Goal: Optimal Comfort and Wellbeing  Outcome: Ongoing, Progressing  Goal: Readiness for Transition of Care  Outcome: Ongoing, Progressing     Problem: Diabetes Comorbidity  Goal: Blood Glucose Level Within Targeted Range  Outcome: Ongoing, Progressing     Problem: Infection (Pneumonia)  Goal: Resolution of Infection Signs and Symptoms  Outcome: Ongoing, Progressing
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
## 2023-06-29 NOTE — HPI
Mr. 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 Meyer, known right clavicle lung lesion, followed by Pulmonary, has PEG tube in place, presented to the ED complaining of worsening generalized weakness, increasing SOB, associated with nausea, vomiting, abdominal discomfort for the past few days.  Chronically home oxygen dependent 2 L O2 N/C, but states he ran out of oxygen recently.  Afebrile in the ED, BP on the low side systolic .  WBC 99278, 0% bands, lactic acid elevated 2.6.  .  CT abdomen pelvis reveals bibasilar infiltrates, gaseous small bowel, no obstruction.  Cultures obtained.  Patient started on IV Zosyn.    Admitting diagnosis:  Sepsis due to pneumonia.  Abdominal pain.  Cachexia.

## 2023-06-29 NOTE — ASSESSMENT & PLAN NOTE
Patient's FSGs are uncontrolled due to hyperglycemia on current medication regimen.  Last A1c reviewed-   Lab Results   Component Value Date    HGBA1C 6.9 (H) 03/06/2023     Most recent fingerstick glucose reviewed- No results for input(s): POCTGLUCOSE in the last 24 hours.  Current correctional scale  Low  Maintain anti-hyperglycemic dose as follows-   Antihyperglycemics (From admission, onward)    Start     Stop Route Frequency Ordered    06/29/23 0658  insulin aspart U-100 pen 0-5 Units         -- SubQ Before meals & nightly PRN 06/29/23 0558        Hold Oral hypoglycemics while patient is in the hospital.

## 2023-06-29 NOTE — ASSESSMENT & PLAN NOTE
-CT abdomen with no evidence of obstruction (preliminary report).    -IV Zosyn empirically.    -known history of recurrent C diff, fecal transplant March 2023  -currently denies diarrhea

## 2023-06-29 NOTE — ASSESSMENT & PLAN NOTE
This patient does have evidence of infective focus  My overall impression is sepsis.  Source: Respiratory  Antibiotics given-   Antibiotics (72h ago, onward)    Start     Stop Route Frequency Ordered    06/29/23 1000  piperacillin-tazobactam (ZOSYN) 4.5 g in dextrose 5 % in water (D5W) 5 % 100 mL IVPB (MB+)         -- IV Every 8 hours (non-standard times) 06/29/23 0551        Latest lactate reviewed-  Recent Labs   Lab 06/28/23  2346   LACTATE 2.6*     Organ dysfunction indicated by Acute respiratory failure    Fluid challenge Ideal Body Weight- The patient's ideal body weight is Ideal body weight: 68.7 kg (151 lb 6.4 oz) which will be used to calculate fluid bolus of 30 ml/kg for treatment of septic shock.      Post- resuscitation assessment No - Post resuscitation assessment not needed       Source control achieved by:  IV antibiotics.
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
## 2023-06-30 ENCOUNTER — ANESTHESIA EVENT (OUTPATIENT)
Dept: ENDOSCOPY | Facility: HOSPITAL | Age: 68
End: 2023-06-30
Payer: MEDICARE

## 2023-06-30 ENCOUNTER — ANESTHESIA (OUTPATIENT)
Dept: ENDOSCOPY | Facility: HOSPITAL | Age: 68
End: 2023-06-30
Payer: MEDICARE

## 2023-06-30 ENCOUNTER — TELEPHONE (OUTPATIENT)
Dept: GASTROENTEROLOGY | Facility: CLINIC | Age: 68
End: 2023-06-30
Payer: MEDICARE

## 2023-06-30 ENCOUNTER — PATIENT MESSAGE (OUTPATIENT)
Dept: GASTROENTEROLOGY | Facility: CLINIC | Age: 68
End: 2023-06-30
Payer: MEDICARE

## 2023-06-30 PROBLEM — D62 ACUTE BLOOD LOSS ANEMIA: Status: ACTIVE | Noted: 2023-06-30

## 2023-06-30 PROBLEM — K92.1 MELENA: Status: ACTIVE | Noted: 2023-06-30

## 2023-06-30 LAB
ALBUMIN SERPL BCP-MCNC: 2.5 G/DL (ref 3.5–5.2)
ALP SERPL-CCNC: 69 U/L (ref 55–135)
ALT SERPL W/O P-5'-P-CCNC: 21 U/L (ref 10–44)
ANION GAP SERPL CALC-SCNC: 8 MMOL/L (ref 8–16)
AST SERPL-CCNC: 16 U/L (ref 10–40)
BASOPHILS # BLD AUTO: 0.03 K/UL (ref 0–0.2)
BASOPHILS # BLD AUTO: 0.04 K/UL (ref 0–0.2)
BASOPHILS NFR BLD: 0.3 % (ref 0–1.9)
BASOPHILS NFR BLD: 0.4 % (ref 0–1.9)
BILIRUB SERPL-MCNC: 0.3 MG/DL (ref 0.1–1)
BUN SERPL-MCNC: 17 MG/DL (ref 8–23)
CALCIUM SERPL-MCNC: 8.4 MG/DL (ref 8.7–10.5)
CHLORIDE SERPL-SCNC: 105 MMOL/L (ref 95–110)
CO2 SERPL-SCNC: 24 MMOL/L (ref 23–29)
CREAT SERPL-MCNC: 0.8 MG/DL (ref 0.5–1.4)
DIFFERENTIAL METHOD: ABNORMAL
DIFFERENTIAL METHOD: ABNORMAL
EOSINOPHIL # BLD AUTO: 0.2 K/UL (ref 0–0.5)
EOSINOPHIL # BLD AUTO: 0.3 K/UL (ref 0–0.5)
EOSINOPHIL NFR BLD: 2.6 % (ref 0–8)
EOSINOPHIL NFR BLD: 3.1 % (ref 0–8)
ERYTHROCYTE [DISTWIDTH] IN BLOOD BY AUTOMATED COUNT: 12.4 % (ref 11.5–14.5)
ERYTHROCYTE [DISTWIDTH] IN BLOOD BY AUTOMATED COUNT: 12.5 % (ref 11.5–14.5)
EST. GFR  (NO RACE VARIABLE): >60 ML/MIN/1.73 M^2
GLUCOSE SERPL-MCNC: 175 MG/DL (ref 70–110)
HCT VFR BLD AUTO: 26.8 % (ref 40–54)
HCT VFR BLD AUTO: 27.9 % (ref 40–54)
HGB BLD-MCNC: 9.1 G/DL (ref 14–18)
HGB BLD-MCNC: 9.4 G/DL (ref 14–18)
IMM GRANULOCYTES # BLD AUTO: 0.03 K/UL (ref 0–0.04)
IMM GRANULOCYTES # BLD AUTO: 0.07 K/UL (ref 0–0.04)
IMM GRANULOCYTES NFR BLD AUTO: 0.3 % (ref 0–0.5)
IMM GRANULOCYTES NFR BLD AUTO: 0.8 % (ref 0–0.5)
LYMPHOCYTES # BLD AUTO: 1.1 K/UL (ref 1–4.8)
LYMPHOCYTES # BLD AUTO: 1.3 K/UL (ref 1–4.8)
LYMPHOCYTES NFR BLD: 11.7 % (ref 18–48)
LYMPHOCYTES NFR BLD: 14.5 % (ref 18–48)
MAGNESIUM SERPL-MCNC: 1.6 MG/DL (ref 1.6–2.6)
MCH RBC QN AUTO: 31.3 PG (ref 27–31)
MCH RBC QN AUTO: 31.4 PG (ref 27–31)
MCHC RBC AUTO-ENTMCNC: 33.7 G/DL (ref 32–36)
MCHC RBC AUTO-ENTMCNC: 34 G/DL (ref 32–36)
MCV RBC AUTO: 92 FL (ref 82–98)
MCV RBC AUTO: 93 FL (ref 82–98)
MONOCYTES # BLD AUTO: 0.6 K/UL (ref 0.3–1)
MONOCYTES # BLD AUTO: 0.6 K/UL (ref 0.3–1)
MONOCYTES NFR BLD: 6.1 % (ref 4–15)
MONOCYTES NFR BLD: 6.4 % (ref 4–15)
NEUTROPHILS # BLD AUTO: 7 K/UL (ref 1.8–7.7)
NEUTROPHILS # BLD AUTO: 7.2 K/UL (ref 1.8–7.7)
NEUTROPHILS NFR BLD: 75.8 % (ref 38–73)
NEUTROPHILS NFR BLD: 78 % (ref 38–73)
NRBC BLD-RTO: 0 /100 WBC
NRBC BLD-RTO: 0 /100 WBC
PLATELET # BLD AUTO: 162 K/UL (ref 150–450)
PLATELET # BLD AUTO: 189 K/UL (ref 150–450)
PLATELET BLD QL SMEAR: ABNORMAL
PMV BLD AUTO: 9.1 FL (ref 9.2–12.9)
PMV BLD AUTO: 9.7 FL (ref 9.2–12.9)
POCT GLUCOSE: 191 MG/DL (ref 70–110)
POCT GLUCOSE: 195 MG/DL (ref 70–110)
POCT GLUCOSE: 210 MG/DL (ref 70–110)
POCT GLUCOSE: 274 MG/DL (ref 70–110)
POTASSIUM SERPL-SCNC: 4 MMOL/L (ref 3.5–5.1)
PROCALCITONIN SERPL IA-MCNC: 0.04 NG/ML
PROT SERPL-MCNC: 5.8 G/DL (ref 6–8.4)
RBC # BLD AUTO: 2.9 M/UL (ref 4.6–6.2)
RBC # BLD AUTO: 3 M/UL (ref 4.6–6.2)
SODIUM SERPL-SCNC: 137 MMOL/L (ref 136–145)
WBC # BLD AUTO: 9.18 K/UL (ref 3.9–12.7)
WBC # BLD AUTO: 9.27 K/UL (ref 3.9–12.7)

## 2023-06-30 PROCEDURE — 83735 ASSAY OF MAGNESIUM: CPT | Performed by: INTERNAL MEDICINE

## 2023-06-30 PROCEDURE — 25000003 PHARM REV CODE 250: Performed by: INTERNAL MEDICINE

## 2023-06-30 PROCEDURE — 63600175 PHARM REV CODE 636 W HCPCS: Performed by: INTERNAL MEDICINE

## 2023-06-30 PROCEDURE — 63600175 PHARM REV CODE 636 W HCPCS: Performed by: STUDENT IN AN ORGANIZED HEALTH CARE EDUCATION/TRAINING PROGRAM

## 2023-06-30 PROCEDURE — 85025 COMPLETE CBC W/AUTO DIFF WBC: CPT | Performed by: INTERNAL MEDICINE

## 2023-06-30 PROCEDURE — 80053 COMPREHEN METABOLIC PANEL: CPT | Performed by: INTERNAL MEDICINE

## 2023-06-30 PROCEDURE — 99215 OFFICE O/P EST HI 40 MIN: CPT | Mod: ,,, | Performed by: INTERNAL MEDICINE

## 2023-06-30 PROCEDURE — 43235 EGD DIAGNOSTIC BRUSH WASH: CPT | Mod: ,,, | Performed by: INTERNAL MEDICINE

## 2023-06-30 PROCEDURE — 85025 COMPLETE CBC W/AUTO DIFF WBC: CPT | Mod: 91 | Performed by: STUDENT IN AN ORGANIZED HEALTH CARE EDUCATION/TRAINING PROGRAM

## 2023-06-30 PROCEDURE — 87338 HPYLORI STOOL AG IA: CPT | Performed by: PHYSICIAN ASSISTANT

## 2023-06-30 PROCEDURE — 36415 COLL VENOUS BLD VENIPUNCTURE: CPT | Performed by: INTERNAL MEDICINE

## 2023-06-30 PROCEDURE — 84145 PROCALCITONIN (PCT): CPT | Performed by: STUDENT IN AN ORGANIZED HEALTH CARE EDUCATION/TRAINING PROGRAM

## 2023-06-30 PROCEDURE — G0378 HOSPITAL OBSERVATION PER HR: HCPCS

## 2023-06-30 PROCEDURE — 43235 EGD DIAGNOSTIC BRUSH WASH: CPT | Performed by: INTERNAL MEDICINE

## 2023-06-30 PROCEDURE — 36415 COLL VENOUS BLD VENIPUNCTURE: CPT | Performed by: STUDENT IN AN ORGANIZED HEALTH CARE EDUCATION/TRAINING PROGRAM

## 2023-06-30 PROCEDURE — 99215 PR OFFICE/OUTPT VISIT, EST, LEVL V, 40-54 MIN: ICD-10-PCS | Mod: ,,, | Performed by: INTERNAL MEDICINE

## 2023-06-30 PROCEDURE — 43235 PR EGD, FLEX, DIAGNOSTIC: ICD-10-PCS | Mod: ,,, | Performed by: INTERNAL MEDICINE

## 2023-06-30 PROCEDURE — 96376 TX/PRO/DX INJ SAME DRUG ADON: CPT

## 2023-06-30 PROCEDURE — 25000003 PHARM REV CODE 250: Performed by: STUDENT IN AN ORGANIZED HEALTH CARE EDUCATION/TRAINING PROGRAM

## 2023-06-30 PROCEDURE — 37000008 HC ANESTHESIA 1ST 15 MINUTES: Performed by: INTERNAL MEDICINE

## 2023-06-30 PROCEDURE — C9113 INJ PANTOPRAZOLE SODIUM, VIA: HCPCS | Performed by: PHYSICIAN ASSISTANT

## 2023-06-30 PROCEDURE — 96372 THER/PROPH/DIAG INJ SC/IM: CPT | Mod: 59 | Performed by: INTERNAL MEDICINE

## 2023-06-30 PROCEDURE — 63600175 PHARM REV CODE 636 W HCPCS: Performed by: PHYSICIAN ASSISTANT

## 2023-06-30 PROCEDURE — 96366 THER/PROPH/DIAG IV INF ADDON: CPT

## 2023-06-30 RX ORDER — PANTOPRAZOLE SODIUM 40 MG/10ML
40 INJECTION, POWDER, LYOPHILIZED, FOR SOLUTION INTRAVENOUS DAILY
Status: DISCONTINUED | OUTPATIENT
Start: 2023-06-30 | End: 2023-07-01 | Stop reason: HOSPADM

## 2023-06-30 RX ORDER — LIDOCAINE HYDROCHLORIDE 10 MG/ML
INJECTION, SOLUTION EPIDURAL; INFILTRATION; INTRACAUDAL; PERINEURAL
Status: DISCONTINUED | OUTPATIENT
Start: 2023-06-30 | End: 2023-06-30

## 2023-06-30 RX ORDER — PHENYLEPHRINE HYDROCHLORIDE 10 MG/ML
INJECTION INTRAVENOUS
Status: DISCONTINUED | OUTPATIENT
Start: 2023-06-30 | End: 2023-06-30

## 2023-06-30 RX ORDER — PROPOFOL 10 MG/ML
VIAL (ML) INTRAVENOUS
Status: DISCONTINUED | OUTPATIENT
Start: 2023-06-30 | End: 2023-06-30

## 2023-06-30 RX ORDER — SODIUM CHLORIDE, SODIUM LACTATE, POTASSIUM CHLORIDE, CALCIUM CHLORIDE 600; 310; 30; 20 MG/100ML; MG/100ML; MG/100ML; MG/100ML
INJECTION, SOLUTION INTRAVENOUS CONTINUOUS PRN
Status: DISCONTINUED | OUTPATIENT
Start: 2023-06-30 | End: 2023-06-30

## 2023-06-30 RX ADMIN — PROPOFOL 50 MG: 10 INJECTION, EMULSION INTRAVENOUS at 02:06

## 2023-06-30 RX ADMIN — PHENYLEPHRINE HYDROCHLORIDE 100 MCG: 10 INJECTION INTRAVENOUS at 03:06

## 2023-06-30 RX ADMIN — PROPOFOL 30 MG: 10 INJECTION, EMULSION INTRAVENOUS at 03:06

## 2023-06-30 RX ADMIN — LIDOCAINE HYDROCHLORIDE 100 MG: 10 SOLUTION INTRAVENOUS at 02:06

## 2023-06-30 RX ADMIN — PIPERACILLIN SODIUM AND TAZOBACTAM SODIUM 4.5 G: 4; .5 INJECTION, POWDER, FOR SOLUTION INTRAVENOUS at 05:06

## 2023-06-30 RX ADMIN — GUAIFENESIN 200 MG: 200 SOLUTION ORAL at 10:06

## 2023-06-30 RX ADMIN — PIPERACILLIN SODIUM AND TAZOBACTAM SODIUM 4.5 G: 4; .5 INJECTION, POWDER, FOR SOLUTION INTRAVENOUS at 01:06

## 2023-06-30 RX ADMIN — INSULIN ASPART 3 UNITS: 100 INJECTION, SOLUTION INTRAVENOUS; SUBCUTANEOUS at 05:06

## 2023-06-30 RX ADMIN — PANTOPRAZOLE SODIUM 40 MG: 40 INJECTION, POWDER, FOR SOLUTION INTRAVENOUS at 10:06

## 2023-06-30 RX ADMIN — SODIUM CHLORIDE, SODIUM LACTATE, POTASSIUM CHLORIDE, AND CALCIUM CHLORIDE: 600; 310; 30; 20 INJECTION, SOLUTION INTRAVENOUS at 02:06

## 2023-06-30 RX ADMIN — GLYCOPYRROLATE 0.2 MG: 0.2 INJECTION, SOLUTION INTRAMUSCULAR; INTRAVITREAL at 03:06

## 2023-06-30 RX ADMIN — Medication 4 TABLET: at 05:06

## 2023-06-30 RX ADMIN — PIPERACILLIN SODIUM AND TAZOBACTAM SODIUM 4.5 G: 4; .5 INJECTION, POWDER, FOR SOLUTION INTRAVENOUS at 10:06

## 2023-06-30 RX ADMIN — Medication 4 TABLET: at 09:06

## 2023-06-30 RX ADMIN — INSULIN ASPART 3 UNITS: 100 INJECTION, SOLUTION INTRAVENOUS; SUBCUTANEOUS at 11:06

## 2023-06-30 RX ADMIN — MORPHINE SULFATE 4 MG: 4 INJECTION INTRAVENOUS at 04:06

## 2023-06-30 NOTE — ASSESSMENT & PLAN NOTE
Antibiotics (72h ago, onward)    Start     Stop Route Frequency Ordered    06/29/23 1000  piperacillin-tazobactam (ZOSYN) 4.5 g in dextrose 5 % in water (D5W) 5 % 100 mL IVPB (MB+)         -- IV Every 8 hours (non-standard times) 06/29/23 0551        Latest lactate reviewed-  Recent Labs   Lab 06/28/23  2346 06/29/23  0706 06/29/23  1041   LACTATE 2.6* 2.2 3.1*

## 2023-06-30 NOTE — TRANSFER OF CARE
"Anesthesia Transfer of Care Note    Patient: Pacheco Rivas    Procedure(s) Performed: Procedure(s) (LRB):  EGD (ESOPHAGOGASTRODUODENOSCOPY) (N/A)    Patient location: PACU    Anesthesia Type: MAC    Transport from OR: Transported from OR on room air with adequate spontaneous ventilation    Post pain: adequate analgesia    Post assessment: no apparent anesthetic complications    Post vital signs: stable    Level of consciousness: responds to stimulation and awake    Nausea/Vomiting: no nausea/vomiting    Complications: none    Transfer of care protocol was followedComments: Report given to Endoscopy PACU RN at bedside. RN given opportunity to ask questions or clarify concerns. No Concerns verbalized. RN was asked if ready to assume care of patient. RN verbally confirmed. Pt. left in stable condition. SV. Vital Signs Return to Near Baseline. No s/s of distress noted.       Last vitals:   Visit Vitals  /60 (BP Location: Left arm, Patient Position: Lying)   Pulse 67   Temp 37 °C (98.6 °F) (Tympanic)   Resp 16   Ht 5' 8" (1.727 m)   Wt 45.4 kg (100 lb)   SpO2 96%   BMI 15.20 kg/m²     "

## 2023-06-30 NOTE — PLAN OF CARE
NS @ 125mL/hr. Peg tube is intact. Flushed with H2O without difficulty. Accu checks AC&HS. Requires assist x1 with ADL's as needed. Remains free from incident/injury. Call light in reach. All active orders reviewed.

## 2023-06-30 NOTE — TELEPHONE ENCOUNTER
----- Message from Stefany Babb PA-C sent at 6/30/2023  1:38 PM CDT -----  Can't remember if I already sent a message or not regarding getting him set up for outpatient follow up. Needs to be within 4-6 weeks. Dr. Fredo Baker has spoken to Dr. Ponce in [place] regarding his MRCP. He recommends outpatient EUS in 8 weeks. I have additional details in my note.    Thanks,  Stefany
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
## 2023-06-30 NOTE — SUBJECTIVE & OBJECTIVE
Interval History: No acute events overnight. Doing well this AM with no complaints at our encounter. Denies CP, SOB, Abdominal pain, fevers/chills.    Review of Systems  Objective:     Vital Signs (Most Recent):  Temp: 97.1 °F (36.2 °C) (06/30/23 0742)  Pulse: 68 (06/30/23 0742)  Resp: 16 (06/30/23 0742)  BP: (!) 95/51 (06/30/23 0742)  SpO2: 100 % (06/29/23 2300) Vital Signs (24h Range):  Temp:  [97.1 °F (36.2 °C)-98.7 °F (37.1 °C)] 97.1 °F (36.2 °C)  Pulse:  [65-79] 68  Resp:  [16-20] 16  SpO2:  [97 %-100 %] 100 %  BP: ()/(51-63) 95/51     Weight: 45.4 kg (100 lb)  Body mass index is 15.2 kg/m².  No intake or output data in the 24 hours ending 06/30/23 0907      Physical Exam      Constitutional:       General: He is awake. He is not in acute distress.     Appearance: He is cachectic. He is ill-appearing.      Interventions: Nasal cannula in place.      Comments: Chronically ill-appearing, thinly built, cachectic,  male, in no respiratory distress.  Currently on 2 L O2 N/C.  Spouse at the bedside.   HENT:      Head: Normocephalic and atraumatic.      Mouth/Throat:      Mouth: Mucous membranes are dry.   Eyes:      General: No scleral icterus.     Conjunctiva/sclera: Conjunctivae normal.   Cardiovascular:      Rate and Rhythm: Regular rhythm.      Heart sounds: No murmur heard.  Pulmonary:      Effort: Pulmonary effort is normal. No respiratory distress.      Breath sounds: Rhonchi present. No wheezing.   Abdominal:      Palpations: Abdomen is soft.      Tenderness: There is abdominal tenderness (Mild generalized).      Comments: Left-sided peg tube in place   Musculoskeletal:         General: No swelling.   Skin:     General: Skin is warm.      Coloration: Skin is not jaundiced.       Significant Labs: \BMP:   Recent Labs   Lab 06/28/23 2036   *      K 4.9   CL 98   CO2 25   BUN 38*   CREATININE 1.2   CALCIUM 9.6     CBC:   Recent Labs   Lab 06/28/23  2036 06/30/23  0537   WBC 23.18*  9.27   HGB 13.0* 9.1*   HCT 37.7* 26.8*    189     CMP:   Recent Labs   Lab 06/28/23 2036      K 4.9   CL 98   CO2 25   *   BUN 38*   CREATININE 1.2   CALCIUM 9.6   PROT 8.8*   ALBUMIN 3.7   BILITOT 0.4   ALKPHOS 96   AST 16   ALT 31   ANIONGAP 14     Cardiac Markers:   Recent Labs   Lab 06/28/23 2036   *     Coagulation: No results for input(s): PT, INR, APTT in the last 48 hours.  Lactic Acid:   Recent Labs   Lab 06/28/23  2346 06/29/23  0706 06/29/23  1041   LACTATE 2.6* 2.2 3.1*     Magnesium: No results for input(s): MG in the last 48 hours.  Troponin:   Recent Labs   Lab 06/28/23 2036   TROPONINI 0.013     TSH:   Recent Labs   Lab 04/22/23  1405   TSH 3.059     Urine Studies:   Recent Labs   Lab 06/29/23  0203   COLORU Yellow   APPEARANCEUA Clear   PHUR 5.0   SPECGRAV 1.020   PROTEINUA Negative   GLUCUA 3+*   KETONESU Negative   BILIRUBINUA Negative   OCCULTUA Negative   NITRITE Negative   UROBILINOGEN Negative   LEUKOCYTESUR Negative   BACTERIA None   HYALINECASTS 4*       Significant Imaging: I have reviewed all pertinent imaging results/findings within the past 24 hours.

## 2023-06-30 NOTE — ASSESSMENT & PLAN NOTE
-PEG in place and functioning. Last gave himself feed at 0900 today.   -Purulent drainage present on exam.  -Given sign of infection around PEG and history of cavitary lung lesion, PNA - recommend ID consult.   -Also recommend IR consult, as PEG initially placed by IR. May need replacement.

## 2023-06-30 NOTE — TELEPHONE ENCOUNTER
Sent a msg to pt on my chart. Pt is still in the hospital.   Hosp f/u per Ms Stefany. Shows pt uses My chart.

## 2023-06-30 NOTE — ASSESSMENT & PLAN NOTE
-Start Protonix 40mg IV  -Keep NPO - hold tube feeds for now. In need of EGD given significant drop in hemoglobin and complaint of black stools. Discussing timing of scope with attending MD. Had last tube feed 9am this morning. Last dose of anticoagulation Tuesday evening.   -Continue to monitor H/H. Transfuse as indicated to keep >7.

## 2023-06-30 NOTE — HPI
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 Meyer, known right clavicle lung lesion, followed by Pulmonary, PEG tube, pacemaker who reported to the ED for progression of upper abdominal pain, nausea, and vomiting. Upon arrival, WBC count noted to be 23.18 and hemoglobin 13. Xray chest and CT completed. CT showed the following:  Developing tree-in-bud opacities at the lung bases suggesting bronchiolitis/small airways disease.  Mild airspace opacities within the left lower lobe.  Peg tube within the stomach with moderate gaseous distension of the stomach.  He has since been started on Zosyn.     Patient reports that his abdominal discomfort, nausea and vomiting began a couple months ago. Had PEG placed about 4 months ago by IR at [facility]. About 2 months ago, was hospitalized for pacemaker placement. During that admission, he reports PEG fell out onto the ground and nurse picked it up and replaced it. Since then, he has been noticing increase in nausea/vomiting after tube feeds. Also feels like his upper abdomen is distended. Denies hematemesis, but states this his stools have been black since Wednesday of this week. Repeat hgb today shows drop from 13 to 9.1. He is not currently on PPI given his history of recurrent c diff. Also mentions increase in purulent drainage from around PEG.  Takes Plavix and Xarelto. Last dose of each of these medications was Tuesday evening, per patient's wife.

## 2023-06-30 NOTE — PROVATION PATIENT INSTRUCTIONS
Discharge Summary/Instructions after an Endoscopic Procedure  Patient Name: Pacheco Pearson MRN: [id number]  Patient YOB: 1955 Friday, June 30, 2023 Tank Yoo MD  Dear patient,  As a result of recent federal legislation (The Federal Cures Act), you may   receive lab or pathology results from your procedure in your [facility]   account before your physician is able to contact you. Your physician or   their representative will relay the results to you with their   recommendations at their soonest availability.  Thank you,  RESTRICTIONS:  During your procedure today, you received medications for sedation.  These   medications may affect your judgment, balance and coordination.  Therefore,   for 24 hours, you have the following restrictions:   - DO NOT drive a car, operate machinery, make legal/financial decisions,   sign important papers or drink alcohol.    ACTIVITY:  Today: no heavy lifting, straining or running due to procedural   sedation/anesthesia.  The following day: return to full activity including work.  DIET:  Eat and drink normally unless instructed otherwise.     TREATMENT FOR COMMON SIDE EFFECTS:  - Mild abdominal pain, nausea, belching, bloating or excessive gas:  rest,   eat lightly and use a heating pad.  - Sore Throat: treat with throat lozenges and/or gargle with warm salt   water.  - Because air was used during the procedure, expelling large amounts of air   from your rectum or belching is normal.  - If a bowel prep was taken, you may not have a bowel movement for 1-3 days.    This is normal.  SYMPTOMS TO WATCH FOR AND REPORT TO YOUR PHYSICIAN:  1. Abdominal pain or bloating, other than gas cramps.  2. Chest pain.  3. Back pain.  4. Signs of infection such as: chills or fever occurring within 24 hours   after the procedure.  5. Rectal bleeding, which would show as bright red, maroon, or black stools.   (A tablespoon of blood from the rectum is not serious, especially if    hemorrhoids are present.)  6. Vomiting.  7. Weakness or dizziness.  GO DIRECTLY TO THE NEAREST EMERGENCY ROOM IF YOU HAVE ANY OF THE FOLLOWING:      Difficulty breathing              Chills and/or fever over 101 F   Persistent vomiting and/or vomiting blood   Severe abdominal pain   Severe chest pain   Black, tarry stools   Bleeding- more than one tablespoon   Any other symptom or condition that you feel may need urgent attention  Your doctor recommends these additional instructions:  If any biopsies were taken, your doctors clinic will contact you in 1 to 2   weeks with any results.  - Return patient to [facility] for [facility] care.   - Resume previous diet.   - Continue present medications.   - Please obtain H. pyloti stool antigen.   -Continue PPI PO daily.   - We will arrange follow-up in GI clinic.  For questions, problems or results please call your physician Tank Yoo MD at Work:  [phone]  If you have any questions about the above instructions, call the GI   department at [phone] or call the endoscopy unit at [phone]   from 7am until 3 pm.  [facility], EMERGENCY ROOM PHONE NUMBER:   [phone]  IF A COMPLICATION OR EMERGENCY SITUATION ARISES AND YOU ARE UNABLE TO REACH   YOUR PHYSICIAN - GO DIRECTLY TO THE EMERGENCY ROOM.  I have read or have had read to me these discharge instructions for my   procedure and have received a written copy.  I understand these   instructions and will follow-up with my physician if I have any questions.     __________________________________       _____________________________________  Nurse Signature                                          Patient/Designated   Responsible Party Signature  Tank Yoo MD  6/30/2023 3:09:22 PM  This report has been verified and signed electronically.  Dear patient,  As a result of recent federal legislation (The Federal Cures Act), you may   receive lab or pathology results  from your procedure in your [facility]   account before your physician is able to contact you. Your physician or   their representative will relay the results to you with their   recommendations at their soonest availability.  Thank you,  [employer]ATION

## 2023-06-30 NOTE — ANESTHESIA POSTPROCEDURE EVALUATION
Anesthesia Post Evaluation    Patient: Pacheco Rivas    Procedure(s) Performed: Procedure(s) (LRB):  EGD (ESOPHAGOGASTRODUODENOSCOPY) (N/A)    Final Anesthesia Type: MAC      Patient location during evaluation: GI PACU  Patient participation: Yes- Able to Participate  Level of consciousness: awake and alert and oriented  Post-procedure vital signs: reviewed and stable  Pain management: adequate  Airway patency: patent  LA NENA mitigation strategies: Multimodal analgesia and Extubation and recovery carried out in lateral, semiupright, or other nonsupine position  PONV status at discharge: No PONV  Anesthetic complications: no      Cardiovascular status: blood pressure returned to baseline  Respiratory status: unassisted  Hydration status: euvolemic  Follow-up not needed.  Comments: Report given to GI PACU RN. Hand Off Tool Used. RN given opportunity to ask questions or clarify concerns. No Concerns verbalized. RN was asked if ready to assume care of patient. RN verbally confirmed. Pt. Left in stable condition. SV. Vital Signs Return to Near Baseline. No s/s of distress noted.               No case tracking events are documented in the log.      Pain/Royce Score: Pain Rating Prior to Med Admin: 7 (6/30/2023  4:30 AM)

## 2023-06-30 NOTE — PLAN OF CARE
Will plan for EGD this afternoon 4pm. Keep NPO, hold tube feeds and anticoagulation. Given that Plavix and Xarelto were last taken Tuesday, EGD will be observatory only. Unable to take biopsies. Recommend H. Pylori stool sample.  HM and nursing staff updated.

## 2023-06-30 NOTE — PLAN OF CARE
[facility] - Med Surg  Initial Discharge Assessment       Primary Care Provider: Amarjit Dominguez MD    Admission Diagnosis: Shortness of breath [R06.02]  SOB (shortness of breath) [R06.02]  Pneumonia of both lower lobes due to infectious organism [J18.9]    Admission Date: 6/28/2023  Expected Discharge Date: per attending         Payor: HU[employer] MANAGED MEDICARE / Plan: HU[employer] MEDICARE HMO / Product Type: Capitation /     Extended Emergency Contact Information  Primary Emergency Contact: Demi Rivas  Address: [address] [place] [address] [id number] [place] [address]  Home Phone: [phone]  Mobile Phone: [phone]  Relation: Spouse    Discharge Plan A: Home with family         [id number] - [place], [place] - [address]  [address] [facility] [address]  Phone: [phone] Fax: [phone]    [id number] [place], [place] - [address] AT [facility] [address] [facility]  [address]  [facility] [address]  Phone: [phone] Fax: [phone]      Initial Assessment (most recent)       Adult Discharge Assessment - 06/30/23 0926          Discharge Assessment    Assessment Type Discharge Planning Assessment     Confirmed/corrected address, phone number and insurance Yes     Confirmed Demographics Correct on Facesheet     Source of Information patient     When was your last doctors appointment? 06/27/23     Communicated BUBBA with patient/caregiver Date not available/Unable to determine     Reason For Admission SOB     People in Home spouse     Do you expect to return to your current living situation? Yes     Do you have help at home or someone to help you manage your care at home? Yes     Who are your caregiver(s) and their phone number(s)? SPOUSE     Prior to hospitilization cognitive status: Alert/Oriented     Current cognitive status: Alert/Oriented     Equipment Currently Used at Home none     Readmission within 30 days? No     Patient  currently being followed by outpatient case management? No     Do you currently have service(s) that help you manage your care at home? No     Do you take prescription medications? Yes     Do you have prescription coverage? Yes     Coverage [employer]     Do you have any problems affording any of your prescribed medications? No     Is the patient taking medications as prescribed? yes     Who is going to help you get home at discharge? spouse     How do you get to doctors appointments? family or friend will provide     Are you on dialysis? No     Do you take coumadin? No     Discharge Plan A Home with family

## 2023-06-30 NOTE — PROGRESS NOTES
[facility] Medicine  Progress Note    Patient Name: Pacheco Rivas  MRN: [id number]  Patient Class: OP- Observation   Admission Date: 6/28/2023  Length of Stay: 0 days  Attending Physician: Jelani Ribera MD  Primary Care Provider: Amarjit Dominguez MD        Subjective:     Principal Problem:Sepsis due to pneumonia        HPI:  Mr. 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 Meyer, known right clavicle lung lesion, followed by Pulmonary, has PEG tube in place, presented to the ED complaining of worsening generalized weakness, increasing SOB, associated with nausea, vomiting, abdominal discomfort for the past few days.  Chronically home oxygen dependent 2 L O2 N/C, but states he ran out of oxygen recently.  Afebrile in the ED, BP on the low side systolic .  WBC 83993, 0% bands, lactic acid elevated 2.6.  .  CT abdomen pelvis reveals bibasilar infiltrates, gaseous small bowel, no obstruction.  Cultures obtained.  Patient started on IV Zosyn.    Admitting diagnosis:  Sepsis due to pneumonia.  Abdominal pain.  Cachexia.        Overview/Hospital Course:  No notes on file    Interval History: Persistent abdominal pain.     Review of Systems  Objective:     Vital Signs (Most Recent):  Temp: 97.1 °F (36.2 °C) (06/30/23 0742)  Pulse: 68 (06/30/23 0742)  Resp: 16 (06/30/23 0742)  BP: (!) 95/51 (06/30/23 0742)  SpO2: 100 % (06/29/23 2300) Vital Signs (24h Range):  Temp:  [97.1 °F (36.2 °C)-98.7 °F (37.1 °C)] 97.1 °F (36.2 °C)  Pulse:  [65-79] 68  Resp:  [16-20] 16  SpO2:  [97 %-100 %] 100 %  BP: ()/(51-63) 95/51     Weight: 45.4 kg (100 lb)  Body mass index is 15.2 kg/m².  No intake or output data in the 24 hours ending 06/30/23 0907      Physical Exam      Constitutional:       General: He is awake. He is not in acute distress.     Appearance: He is cachectic. He is ill-appearing.       Interventions: Nasal cannula in place.      Comments: Chronically ill-appearing, thinly built, cachectic,  male, in no respiratory distress.  Currently on 2 L O2 N/C.  Spouse at the bedside.   HENT:      Head: Normocephalic and atraumatic.      Mouth/Throat:      Mouth: Mucous membranes are dry.   Eyes:      General: No scleral icterus.     Conjunctiva/sclera: Conjunctivae normal.   Cardiovascular:      Rate and Rhythm: Regular rhythm.      Heart sounds: No murmur heard.  Pulmonary:      Effort: Pulmonary effort is normal. No respiratory distress.      Breath sounds: Rhonchi present. No wheezing.   Abdominal:      Palpations: Abdomen is soft.      Tenderness: There is abdominal tenderness (Mild generalized).      Comments: Left-sided peg tube in place   Musculoskeletal:         General: No swelling.   Skin:     General: Skin is warm.      Coloration: Skin is not jaundiced.       Significant Labs: \BMP:   Recent Labs   Lab 06/28/23 2036   *      K 4.9   CL 98   CO2 25   BUN 38*   CREATININE 1.2   CALCIUM 9.6     CBC:   Recent Labs   Lab 06/28/23 2036 06/30/23  0537   WBC 23.18* 9.27   HGB 13.0* 9.1*   HCT 37.7* 26.8*    189     CMP:   Recent Labs   Lab 06/28/23 2036      K 4.9   CL 98   CO2 25   *   BUN 38*   CREATININE 1.2   CALCIUM 9.6   PROT 8.8*   ALBUMIN 3.7   BILITOT 0.4   ALKPHOS 96   AST 16   ALT 31   ANIONGAP 14     Cardiac Markers:   Recent Labs   Lab 06/28/23 2036   *     Coagulation: No results for input(s): PT, INR, APTT in the last 48 hours.  Lactic Acid:   Recent Labs   Lab 06/28/23  2346 06/29/23  0706 06/29/23  1041   LACTATE 2.6* 2.2 3.1*     Magnesium: No results for input(s): MG in the last 48 hours.  Troponin:   Recent Labs   Lab 06/28/23 2036   TROPONINI 0.013     TSH:   Recent Labs   Lab 04/22/23  1405   TSH 3.059     Urine Studies:   Recent Labs   Lab 06/29/23 0203   COLORU Yellow   APPEARANCEUA Clear   PHUR 5.0   SPECGRAV 1.020    PROTEINUA Negative   GLUCUA 3+*   KETONESU Negative   BILIRUBINUA Negative   OCCULTUA Negative   NITRITE Negative   UROBILINOGEN Negative   LEUKOCYTESUR Negative   BACTERIA None   HYALINECASTS 4*       Significant Imaging: I have reviewed all pertinent imaging results/findings within the past 24 hours.      Assessment/Plan:      * Sepsis without shock- improving  This patient does have evidence of infective focus  My overall impression is sepsis.  Source: Respiratory  Antibiotics given-   Antibiotics (72h ago, onward)      Start     Stop Route Frequency Ordered    06/29/23 1000  piperacillin-tazobactam (ZOSYN) 4.5 g in dextrose 5 % in water (D5W) 5 % 100 mL IVPB (MB+)         -- IV Every 8 hours (non-standard times) 06/29/23 0551          06/30/2023  --leukocytosis resolved, remains afebrile  --evidence of infective focus: possible PNA, vs local infection around PEG tube insertion site  --Per Surgery, GI service placed PEG tube; GI consulted to assess   --Blood Cultures NGTD  --continue IV Zosyn for empiric coverage    Abdominal pain  -CT abdomen with no evidence of obstruction (preliminary report).    -IV Zosyn empirically.    -known history of recurrent C diff, fecal transplant March 2023  -currently denies diarrhea    06/30/2023  --Per Surgery, GI service placed PEG tube; GI consulted to assess   --Blood Cultures NGTD  --continue to monitor       Lactic acidosis  -lactic acid 2.6.    -continue NS at 125 cc/hour.    -lactic acid every 4 hours x2.      Severely underweight adult  -BMI 15      PEG (percutaneous endoscopic gastrostomy) status  -patient states that he can tolerate clear liquids      Type 2 diabetes mellitus with hyperglycemia, with long-term current use of insulin  Patient's FSGs are uncontrolled due to hyperglycemia on current medication regimen.  Last A1c reviewed-   Lab Results   Component Value Date    HGBA1C 6.9 (H) 03/06/2023     Most recent fingerstick glucose reviewed- No results for  input(s): POCTGLUCOSE in the last 24 hours.  Current correctional scale  Low  Maintain anti-hyperglycemic dose as follows-   Antihyperglycemics (From admission, onward)      Start     Stop Route Frequency Ordered    06/29/23 0658  insulin aspart U-100 pen 0-5 Units         -- SubQ Before meals & nightly PRN 06/29/23 0558          Hold Oral hypoglycemics while patient is in the hospital.      VTE Risk Mitigation (From admission, onward)           Ordered     Place sequential compression device  Until discontinued         06/29/23 0550                    Discharge Planning   BUBBA:      Code Status: Full Code   Is the patient medically ready for discharge?:     Reason for patient still in hospital (select all that apply): Patient trending condition and Laboratory test                     Jelani Ribera MD  Department of Hospital Medicine   [facility] - Med Surg

## 2023-06-30 NOTE — ANESTHESIA PREPROCEDURE EVALUATION
06/30/2023  Pacheco Rivas is a 67 y.o., male.      Pre-op Assessment    I have reviewed the Patient Summary Reports.     I have reviewed the Nursing Notes. I have reviewed the NPO Status.   I have reviewed the Medications.     Review of Systems  Anesthesia Hx:  No problems with previous Anesthesia    Social:  Non-Smoker    Hematology/Oncology:  Hematology Normal   Oncology Normal     EENT/Dental:EENT/Dental Normal   Cardiovascular:   Hypertension, well controlled CAD asymptomatic CABG/stent  CHF hyperlipidemia    Pulmonary:  Pulmonary Normal    Renal/:   Chronic Renal Disease, CKD renal calculi    Hepatic/GI:   Bowel Prep.    Musculoskeletal:  Musculoskeletal Normal    Neurological:   Neuromuscular Disease,    Endocrine:   Diabetes, well controlled, type 2 Hypothyroidism  Diabetes    Dermatological:  Skin Normal    Psych:  Psychiatric Normal           Physical Exam  General: Well nourished, Cooperative, Alert and Oriented    Airway:  Mallampati: II   Mouth Opening: Normal  TM Distance: Normal  Tongue: Normal  Neck ROM: Normal ROM    Dental:  Intact, Periodontal disease, Edentulous    Chest/Lungs:  Clear to auscultation    Heart:  Rate: Normal        Anesthesia Plan  Type of Anesthesia, risks & benefits discussed:    Anesthesia Type: MAC  Intra-op Monitoring Plan: Standard ASA Monitors  Induction:  IV  Informed Consent: Informed consent signed with the Patient and all parties understand the risks and agree with anesthesia plan.  All questions answered. Patient consented to blood products? Yes  ASA Score: 3    Ready For Surgery From Anesthesia Perspective.     .
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
## 2023-06-30 NOTE — PLAN OF CARE
Pt placed in pt room 572 with wife.  Pt  report given to RAUL Avila.  Pt alert, NAD, pt moved to bed on own.

## 2023-07-01 LAB
ALBUMIN SERPL BCP-MCNC: 2.2 G/DL (ref 3.5–5.2)
ALP SERPL-CCNC: 65 U/L (ref 55–135)
ALT SERPL W/O P-5'-P-CCNC: 29 U/L (ref 10–44)
ANION GAP SERPL CALC-SCNC: 7 MMOL/L (ref 8–16)
AST SERPL-CCNC: 26 U/L (ref 10–40)
BASOPHILS # BLD AUTO: 0.03 K/UL (ref 0–0.2)
BASOPHILS NFR BLD: 0.4 % (ref 0–1.9)
BILIRUB SERPL-MCNC: 0.2 MG/DL (ref 0.1–1)
BUN SERPL-MCNC: 10 MG/DL (ref 8–23)
CALCIUM SERPL-MCNC: 7.9 MG/DL (ref 8.7–10.5)
CHLORIDE SERPL-SCNC: 106 MMOL/L (ref 95–110)
CO2 SERPL-SCNC: 24 MMOL/L (ref 23–29)
CREAT SERPL-MCNC: 0.8 MG/DL (ref 0.5–1.4)
DIFFERENTIAL METHOD: ABNORMAL
EOSINOPHIL # BLD AUTO: 0.4 K/UL (ref 0–0.5)
EOSINOPHIL NFR BLD: 5 % (ref 0–8)
ERYTHROCYTE [DISTWIDTH] IN BLOOD BY AUTOMATED COUNT: 12.3 % (ref 11.5–14.5)
EST. GFR  (NO RACE VARIABLE): >60 ML/MIN/1.73 M^2
GLUCOSE SERPL-MCNC: 238 MG/DL (ref 70–110)
HCT VFR BLD AUTO: 26.9 % (ref 40–54)
HGB BLD-MCNC: 9.2 G/DL (ref 14–18)
IMM GRANULOCYTES # BLD AUTO: 0.03 K/UL (ref 0–0.04)
IMM GRANULOCYTES NFR BLD AUTO: 0.4 % (ref 0–0.5)
LYMPHOCYTES # BLD AUTO: 1.4 K/UL (ref 1–4.8)
LYMPHOCYTES NFR BLD: 16.9 % (ref 18–48)
MCH RBC QN AUTO: 30.9 PG (ref 27–31)
MCHC RBC AUTO-ENTMCNC: 34.2 G/DL (ref 32–36)
MCV RBC AUTO: 90 FL (ref 82–98)
MONOCYTES # BLD AUTO: 0.4 K/UL (ref 0.3–1)
MONOCYTES NFR BLD: 5.3 % (ref 4–15)
NEUTROPHILS # BLD AUTO: 5.9 K/UL (ref 1.8–7.7)
NEUTROPHILS NFR BLD: 72 % (ref 38–73)
NRBC BLD-RTO: 0 /100 WBC
PLATELET # BLD AUTO: 207 K/UL (ref 150–450)
PMV BLD AUTO: 9.7 FL (ref 9.2–12.9)
POCT GLUCOSE: 273 MG/DL (ref 70–110)
POCT GLUCOSE: 365 MG/DL (ref 70–110)
POCT GLUCOSE: 401 MG/DL (ref 70–110)
POTASSIUM SERPL-SCNC: 3.8 MMOL/L (ref 3.5–5.1)
PROT SERPL-MCNC: 5.4 G/DL (ref 6–8.4)
RBC # BLD AUTO: 2.98 M/UL (ref 4.6–6.2)
SODIUM SERPL-SCNC: 137 MMOL/L (ref 136–145)
WBC # BLD AUTO: 8.18 K/UL (ref 3.9–12.7)

## 2023-07-01 PROCEDURE — 36415 COLL VENOUS BLD VENIPUNCTURE: CPT | Performed by: STUDENT IN AN ORGANIZED HEALTH CARE EDUCATION/TRAINING PROGRAM

## 2023-07-01 PROCEDURE — 25000003 PHARM REV CODE 250: Performed by: INTERNAL MEDICINE

## 2023-07-01 PROCEDURE — 85025 COMPLETE CBC W/AUTO DIFF WBC: CPT | Performed by: STUDENT IN AN ORGANIZED HEALTH CARE EDUCATION/TRAINING PROGRAM

## 2023-07-01 PROCEDURE — 96372 THER/PROPH/DIAG INJ SC/IM: CPT | Mod: 59

## 2023-07-01 PROCEDURE — G0378 HOSPITAL OBSERVATION PER HR: HCPCS

## 2023-07-01 PROCEDURE — 63600175 PHARM REV CODE 636 W HCPCS: Performed by: INTERNAL MEDICINE

## 2023-07-01 PROCEDURE — 80053 COMPREHEN METABOLIC PANEL: CPT | Performed by: STUDENT IN AN ORGANIZED HEALTH CARE EDUCATION/TRAINING PROGRAM

## 2023-07-01 PROCEDURE — 96366 THER/PROPH/DIAG IV INF ADDON: CPT

## 2023-07-01 RX ORDER — MORPHINE SULFATE 20 MG/ML
10 SOLUTION ORAL EVERY 4 HOURS PRN
Qty: 30 ML | Refills: 0 | Status: SHIPPED | OUTPATIENT
Start: 2023-07-01 | End: 2023-07-11

## 2023-07-01 RX ORDER — CEFDINIR 300 MG/1
300 CAPSULE ORAL 2 TIMES DAILY
Qty: 4 CAPSULE | Refills: 0 | Status: SHIPPED | OUTPATIENT
Start: 2023-07-01 | End: 2023-07-03

## 2023-07-01 RX ORDER — TRAMADOL HYDROCHLORIDE 50 MG/1
50 TABLET ORAL EVERY 8 HOURS PRN
Qty: 42 TABLET | Refills: 0 | Status: SHIPPED | OUTPATIENT
Start: 2023-07-01 | End: 2023-07-15

## 2023-07-01 RX ADMIN — INSULIN ASPART 3 UNITS: 100 INJECTION, SOLUTION INTRAVENOUS; SUBCUTANEOUS at 06:07

## 2023-07-01 RX ADMIN — MORPHINE SULFATE 2 MG: 4 INJECTION INTRAVENOUS at 08:07

## 2023-07-01 RX ADMIN — PIPERACILLIN SODIUM AND TAZOBACTAM SODIUM 4.5 G: 4; .5 INJECTION, POWDER, FOR SOLUTION INTRAVENOUS at 02:07

## 2023-07-01 RX ADMIN — Medication 4 TABLET: at 08:07

## 2023-07-01 RX ADMIN — INSULIN ASPART 3 UNITS: 100 INJECTION, SOLUTION INTRAVENOUS; SUBCUTANEOUS at 12:07

## 2023-07-01 NOTE — PLAN OF CARE
[facility]  Discharge Final Note    Primary Care Provider: Amarjit Dominguez MD    Expected Discharge Date: 7/1/2023    Final Discharge Note (most recent)       Final Note - 07/01/23 0758          Final Note    Assessment Type Final Discharge Note     Anticipated Discharge Disposition Home or Self Care        Post-Acute Status    Discharge Delays None known at this time                     Important Message from Medicare             Contact Info       Tank Yoo MD   Specialty: Gastroenterology    [address] [facility] [address]   Phone: [phone]       Next Steps: Follow up    Amarjit Dominguez MD   Specialty: Pediatrics   Relationship: PCP - General    [facility]  [address] [address] [address]   Phone: [phone]       Next Steps: Schedule an appointment as soon as possible for a visit          Pt has no discharge needs at the time of chart review.

## 2023-07-01 NOTE — HOSPITAL COURSE
"Mr. 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 He presented with low blood pressure and an elevated white cell count, suggesting sepsis from a possible respiratory source or PEG tube infection.    During his stay, he was started on IV Zosyn and remained afebrile, with his leukocytosis resolving. Both a possible pneumonia and a local infection around his PEG tube site were considered as potential sources of infection. Gastroenterology was consulted to assess the PEG tube and performed an EGD, which revealed normal findings except for mild erythema in the antrum and mild drainage around the PEG tube.    The treatment plan included continuation of IV Zosyn for empirical coverage, initiation of IV PPI daily during his hospital stay with plans to continue PPI orally after discharge, and oral antibiotics for potential PEG tube infection. Testing for H. Pylori via stool antigen was ordered, and a follow-up in the GI clinic was arranged. Mr. Rivas was discharged with two additional days of antibiotics and pain medication. Patient and family acknowledged understanding and agreed the aforementioned plan.    /60 (BP Location: Right arm, Patient Position: Lying)   Pulse 69   Temp 97.8 °F (36.6 °C) (Oral)   Resp 18   Ht 5' 8" (1.727 m)   Wt 45.4 kg (100 lb)   SpO2 99%   BMI 15.20 kg/m²     PHYSICAL EXAM  Constitutional:       General: He is awake. He is not in acute distress.     Appearance: He is cachectic. He is ill-appearing.      Interventions: Nasal cannula in place.      Comments: Chronically ill-appearing, thinly built, cachectic,  male, in no respiratory distress.  Currently on 2 L O2 N/C.  Spouse at the bedside.   HENT:      Head: Normocephalic and atraumatic.      Mouth/Throat: "      Mouth: Mucous membranes are dry.   Eyes:      General: No scleral icterus.     Conjunctiva/sclera: Conjunctivae normal.   Cardiovascular:      Rate and Rhythm: Regular rhythm.      Heart sounds: No murmur heard.  Pulmonary:      Effort: Pulmonary effort is normal. No respiratory distress.      Breath sounds: Rhonchi present. No wheezing.   Abdominal:      Palpations: Abdomen is soft.      Tenderness: There is abdominal tenderness (Mild generalized).      Comments: Left-sided peg tube in place   Musculoskeletal:         General: No swelling.   Skin:     General: Skin is warm.      Coloration: Skin is not jaundiced.

## 2023-07-01 NOTE — DISCHARGE SUMMARY
[facility] Medicine  Discharge Summary      Patient Name: Pacheco Rivas  MRN: [id number]  [facility]: [id number]  Patient Class: OP- Observation  Admission Date: 6/28/2023  Hospital Length of Stay: 0 days  Discharge Date and Time:  07/01/2023 9:08 AM  Attending Physician: Jelani Ribera MD   Discharging Provider: Jelani Ribera MD  Primary Care Provider: Amarjit Dominguez MD    Primary Care Team: Networked reference to record PCT     HPI:   Mr. 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 Meyer, known right clavicle lung lesion, followed by Pulmonary, has PEG tube in place, presented to the ED complaining of worsening generalized weakness, increasing SOB, associated with nausea, vomiting, abdominal discomfort for the past few days.  Chronically home oxygen dependent 2 L O2 N/C, but states he ran out of oxygen recently.  Afebrile in the ED, BP on the low side systolic .  WBC 41144, 0% bands, lactic acid elevated 2.6.  .  CT abdomen pelvis reveals bibasilar infiltrates, gaseous small bowel, no obstruction.  Cultures obtained.  Patient started on IV Zosyn.    Admitting diagnosis:  Sepsis due to pneumonia.  Abdominal pain.  Cachexia.        Procedure(s) (LRB):  EGD (ESOPHAGOGASTRODUODENOSCOPY) (N/A)      Hospital Course:   Mr. 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 He presented with low blood pressure and an elevated white cell count, suggesting sepsis from a possible respiratory source or PEG tube infection.    During his stay, he was started on IV Zosyn and remained afebrile, with his leukocytosis resolving. Both a possible pneumonia and a local infection around his PEG  "tube site were considered as potential sources of infection. Gastroenterology was consulted to assess the PEG tube and performed an EGD, which revealed normal findings except for mild erythema in the antrum and mild drainage around the PEG tube.    The treatment plan included continuation of IV Zosyn for empirical coverage, initiation of IV PPI daily during his hospital stay with plans to continue PPI orally after discharge, and oral antibiotics for potential PEG tube infection. Testing for H. Pylori via stool antigen was ordered, and a follow-up in the GI clinic was arranged. Mr. Rivas was discharged with two additional days of antibiotics and pain medication. Patient and family acknowledged understanding and agreed the aforementioned plan.    /60 (BP Location: Right arm, Patient Position: Lying)   Pulse 69   Temp 97.8 °F (36.6 °C) (Oral)   Resp 18   Ht 5' 8" (1.727 m)   Wt 45.4 kg (100 lb)   SpO2 99%   BMI 15.20 kg/m²     PHYSICAL EXAM  Constitutional:       General: He is awake. He is not in acute distress.     Appearance: He is cachectic. He is ill-appearing.      Interventions: Nasal cannula in place.      Comments: Chronically ill-appearing, thinly built, cachectic,  male, in no respiratory distress.  Currently on 2 L O2 N/C.  Spouse at the bedside.   HENT:      Head: Normocephalic and atraumatic.      Mouth/Throat:      Mouth: Mucous membranes are dry.   Eyes:      General: No scleral icterus.     Conjunctiva/sclera: Conjunctivae normal.   Cardiovascular:      Rate and Rhythm: Regular rhythm.      Heart sounds: No murmur heard.  Pulmonary:      Effort: Pulmonary effort is normal. No respiratory distress.      Breath sounds: Rhonchi present. No wheezing.   Abdominal:      Palpations: Abdomen is soft.      Tenderness: There is abdominal tenderness (Mild generalized).      Comments: Left-sided peg tube in place   Musculoskeletal:         General: No swelling.   Skin:     General: Skin is " warm.      Coloration: Skin is not jaundiced.          Goals of Care Treatment Preferences:  Code Status: Full Code      Consults:   Consults (From admission, onward)        Status Ordering Provider     Inpatient consult to Gastroenterology  Once        Provider:  Tank Yoo MD    Completed ANI PHILLIPS          No new Assessment & Plan notes have been filed under this hospital service since the last note was generated.  Service: Hospital Medicine    Final Active Diagnoses:    Diagnosis Date Noted POA    PRINCIPAL PROBLEM:  Sepsis due to pneumonia [J18.9, A41.9] 06/29/2023 Yes    Acute blood loss anemia [D62] 06/30/2023 No    Melena [K92.1] 06/30/2023 Yes    Lactic acidosis [E87.20] 06/29/2023 Yes    Abdominal pain [R10.9] 06/29/2023 Yes    Severely underweight adult [R63.6] 04/17/2023 Yes    Type 2 diabetes mellitus with hyperglycemia, with long-term current use of insulin [E11.65, Z79.4] 04/09/2023 Not Applicable    PEG (percutaneous endoscopic gastrostomy) status [Z93.1] 04/09/2023 Not Applicable      Problems Resolved During this Admission:       Discharged Condition: good    Disposition: Home or Self Care    Follow Up:   Follow-up Information     Tank Yoo MD .    Specialty: Gastroenterology  Contact information:  [address] [facility] [address] [phone]             Amarjit Dominguez MD. Schedule an appointment as soon as possible for a visit.    Specialty: Pediatrics  Contact information:  [address] [facility] [address] [phone]                       Patient Instructions:      Diet diabetic       Significant Diagnostic Studies:   BMP: Recent Labs   Lab 06/30/23  0537 07/01/23  0656   * 238*    137   K 4.0 3.8    106   CO2 24 24   BUN 17 10   CREATININE 0.8 0.8   CALCIUM 8.4* 7.9*   MG 1.6  --      CBC:   Recent Labs   Lab 06/30/23  0537 06/30/23  1208 07/01/23  0656   WBC 9.27 9.18 8.18   HGB 9.1* 9.4* 9.2*   HCT 26.8* 27.9* 26.9*     162 207     CMP:   Recent Labs   Lab 06/30/23  0537 07/01/23  0656    137   K 4.0 3.8    106   CO2 24 24   * 238*   BUN 17 10   CREATININE 0.8 0.8   CALCIUM 8.4* 7.9*   PROT 5.8* 5.4*   ALBUMIN 2.5* 2.2*   BILITOT 0.3 0.2   ALKPHOS 69 65   AST 16 26   ALT 21 29   ANIONGAP 8 7*     Cardiac Markers: No results for input(s): CKMB, MYOGLOBIN, BNP, TROPISTAT in the last 48 hours.  Coagulation: No results for input(s): PT, INR, APTT in the last 48 hours.  Lactic Acid:   Recent Labs   Lab 06/29/23  1041   LACTATE 3.1*     Magnesium:   Recent Labs   Lab 06/30/23  0537   MG 1.6     Troponin: No results for input(s): TROPONINI, TROPONINIHS in the last 48 hours.  TSH:   Recent Labs   Lab 04/22/23  1405   TSH 3.059     Urine Studies:   No results for input(s): COLORU, APPEARANCEUA, PHUR, SPECGRAV, PROTEINUA, GLUCUA, KETONESU, BILIRUBINUA, OCCULTUA, NITRITE, UROBILINOGEN, LEUKOCYTESUR, RBCUA, WBCUA, BACTERIA, SQUAMEPITHEL, HYALINECASTS in the last 48 hours.    Invalid input(s): WRIGHTSUR    Imaging Results          CT Abdomen Pelvis  Without Contrast (Final result)  Result time 06/29/23 08:42:36    Final result by Matias Del Rosario III, MD (06/29/23 08:42:36)                 Impression:      Developing tree-in-bud opacities at the lung bases suggesting bronchiolitis/small airways disease.  Mild airspace opacities within the left lower lobe.    Peg tube within the stomach with moderate gaseous distension of the stomach.    I agree with the preliminary interpretation.      Electronically signed by: Greg Del Rosario  Date:    06/29/2023  Time:    08:42             Narrative:    EXAMINATION:  CT ABDOMEN PELVIS WITHOUT CONTRAST    CLINICAL HISTORY:  Abdominal pain, acute, nonlocalized;    TECHNIQUE:  Low dose axial images, sagittal and coronal reformations were obtained from the lung bases to the pubic symphysis.    COMPARISON:  CT abdomen and pelvis 05/14/2023    FINDINGS:  Developing tree-in-bud  opacities within the left lower lobe, and lesser extent right lower lobe suggesting small airways disease.  Small areas of airspace opacity within the left lower lobe have developed suggesting atelectasis or pneumonia.    Mild dependent atelectasis within the left lower lobe.  Unchanged cyst within the right hepatic lobe.  The liver is otherwise unremarkable.  Patient is status post cholecystectomy.    There is a PEG tube within the stomach.  Moderate gaseous distension of the stomach.  No free intraperitoneal air or free fluid.    Air-filled loops of small bowel which are not dilated.  Findings may reflect a mild ileus.    Radiopaque device noted within the region of the right ventricle compatible with a lead less cardiac pacer device.  There is coronary artery calcification.  The spleen, adrenal glands and kidneys appear normal.  Pancreas is atrophic.  No aortic aneurysm.  Calcification within the aorta and its branches.  The bladder is within normal limits.  The prostate is not enlarged.    Degenerative changes within the lumbar spine                               X-Ray Chest 1 View (Final result)  Result time 06/28/23 22:54:41    Final result by Sally Turcios MD (06/28/23 22:54:41)                 Impression:      No active finding      Electronically signed by: Sally Turcios  Date:    06/28/2023  Time:    22:54             Narrative:    EXAMINATION:  XR CHEST 1 VIEW    CLINICAL HISTORY:  Shortness of breath    TECHNIQUE:  Single frontal portable view of the chest was performed.    COMPARISON:  03/25/2023    FINDINGS:  No new pulmonary consolidation or sizable pleural effusion.  Mediastinal contour stable with surgical change.  No convincing pneumothorax                                  Pending Diagnostic Studies:     None         Medications:  Reconciled Home Medications:      Medication List      START taking these medications    cefdinir 300 MG capsule  Commonly known as: OMNICEF  Take 1 capsule  (300 mg total) by mouth 2 (two) times daily. for 2 days     LANSOPRAZOLE 3 MG/ML SUSP  Commonly known as: PREVACID  Take 5 mLs (15 mg total) by mouth once daily.     morphine 100 mg/5 mL (20 mg/mL) concentrated solution  Take 0.5 mLs (10 mg total) by mouth every 4 (four) hours as needed for Pain.     traMADoL 50 mg tablet  Commonly known as: ULTRAM  Take 1 tablet (50 mg total) by mouth every 8 (eight) hours as needed for Pain.        CONTINUE taking these medications    atorvastatin 40 MG tablet  Commonly known as: LIPITOR  Take 40 mg by mouth every evening.     cholestyramine 4 gram packet  Commonly known as: QUESTRAN  Take 4 g by mouth 2 (two) times daily.     clopidogreL 75 mg tablet  Commonly known as: PLAVIX  Take 75 mg by mouth once daily.     ergocalciferol 200 mcg/mL (8,000 unit/mL) Drop  Commonly known as: DRISDOL  Take 8,000 Units by mouth once daily.     fluticasone-umeclidin-vilanter 100-62.5-25 mcg Dsdv  Commonly known as: TRELEGY ELLIPTA  Take 1 puff by mouth once daily.     gabapentin 250 mg/5 mL solution  Commonly known as: NEURONTIN  Take 5 mLs by mouth 2 (two) times a day.     insulin glargine-lixisenatide 100 unit-33 mcg/mL Inpn pen  Commonly known as: SOLIQUA 100/33  Inject 50 Units into the skin every morning.     levothyroxine 25 MCG tablet  Commonly known as: SYNTHROID  Take 25 mcg by mouth every morning.     meclizine 25 mg tablet  Commonly known as: ANTIVERT  Take 1 tablet by mouth 3 (three) times daily as needed.     metFORMIN 500 MG ER 24hr tablet  Commonly known as: GLUCOPHAGE-XR  Take 500 mg by mouth every evening.     metroNIDAZOLE 500 MG tablet  Commonly known as: FLAGYL  Take 500 mg by mouth 3 (three) times daily.     midodrine 5 MG Tab  Commonly known as: PROAMATINE  Take 5 mg by mouth 2 (two) times daily with meals.     mupirocin 2 % ointment  Commonly known as: BACTROBAN  Apply topically 2 (two) times daily. for 10 days     ondansetron 4 MG Tbdl  Commonly known as: ZOFRAN-ODT  Take  1 tablet (4 mg total) by mouth every 6 (six) hours as needed (prn).     ranolazine 500 MG Tb12  Commonly known as: RANEXA  Take 1 tablet (500 mg total) by mouth 2 (two) times daily.     rOPINIRole 1 MG tablet  Commonly known as: REQUIP  Take 1 tablet by mouth 3 (three) times daily.     sucralfate 100 mg/mL suspension  Commonly known as: CARAFATE  10 mLs by Per G Tube route 4 (four) times daily.     tamsulosin 0.4 mg Cap  Commonly known as: FLOMAX  Take 0.4 mg by mouth once daily.            Indwelling Lines/Drains at time of discharge:   Lines/Drains/Airways     Drain  Duration                Gastrostomy/Enterostomy 04/18/23 1526 Gastrostomy tube w/ balloon LUQ feeding 73 days                Time spent on the discharge of patient: 35 minutes  of time spent on discharge including examining patient, providing discharge instructions, arranging follow-up and documentation.           Jelani Ribera MD  Department of Hospital Medicine  [facility]

## 2023-07-01 NOTE — PLAN OF CARE
Problem: Adult Inpatient Plan of Care  Goal: Plan of Care Review  Outcome: Ongoing, Progressing     Problem: Adult Inpatient Plan of Care  Goal: Patient-Specific Goal (Individualized)  Outcome: Ongoing, Progressing  Flowsheets (Taken 7/1/2023 0142)  Anxieties, Fears or Concerns: pain control  Individualized Care Needs: room warm     Problem: Diabetes Comorbidity  Goal: Blood Glucose Level Within Targeted Range  Outcome: Ongoing, Progressing     Problem: Infection Progression (Sepsis/Septic Shock)  Goal: Absence of Infection Signs and Symptoms  Outcome: Ongoing, Progressing     Problem: Infection (Pneumonia)  Goal: Resolution of Infection Signs and Symptoms  Outcome: Ongoing, Progressing     Problem: Respiratory Compromise (Pneumonia)  Goal: Effective Oxygenation and Ventilation  Outcome: Ongoing, Progressing     Problem: Skin Injury Risk Increased  Goal: Skin Health and Integrity  Outcome: Ongoing, Progressing     Problem: Impaired Wound Healing  Goal: Optimal Wound Healing  Outcome: Ongoing, Progressing    Discussed POC with pt and spouse, verbalized understanding.  Patient remains free from injury. Safety precautions maintained. No s/s of acute distress.  Purposeful rounding every two hours.   Pain controlled per MD order.   Cardiac monitoring in place, tele box number [id number]  Blood glucose monitoring continued this shift.   Diet orders continued, pt diet: clear liquid, independent tube feedings  Vital signs continued per orders this shift.   Chart and orders review completed. Pt education about care completed.

## 2023-07-01 NOTE — PLAN OF CARE
POC discussed with patient and wife; Verbalized understanding. MD notified of patient vital signs. IV morphine given for abdominal pain per MD order. PEG tube remains in place; flushes without difficulty. Remains free from injury. IV removed per policy. Discharge instructions reviewed with patient and wife; Verbalized understanding. No further concerns at this time. Chart review completed.

## 2023-07-03 VITALS
RESPIRATION RATE: 18 BRPM | HEIGHT: 68 IN | DIASTOLIC BLOOD PRESSURE: 60 MMHG | OXYGEN SATURATION: 99 % | TEMPERATURE: 98 F | WEIGHT: 100 LBS | SYSTOLIC BLOOD PRESSURE: 104 MMHG | BODY MASS INDEX: 15.16 KG/M2 | HEART RATE: 69 BPM

## 2023-07-04 LAB
BACTERIA BLD CULT: NORMAL
BACTERIA BLD CULT: NORMAL

## 2023-07-09 LAB
H PYLORI AG STL QL IA: NOT DETECTED
SPECIMEN SOURCE: NORMAL

## 2023-07-11 LAB — POCT GLUCOSE: 317 MG/DL (ref 70–110)

## 2023-10-02 PROBLEM — A41.9 SEPSIS DUE TO PNEUMONIA: Status: RESOLVED | Noted: 2023-06-29 | Resolved: 2023-10-02

## 2023-10-02 PROBLEM — J18.9 SEPSIS DUE TO PNEUMONIA: Status: RESOLVED | Noted: 2023-06-29 | Resolved: 2023-10-02
